# Patient Record
Sex: FEMALE | Race: BLACK OR AFRICAN AMERICAN | NOT HISPANIC OR LATINO | Employment: FULL TIME | URBAN - METROPOLITAN AREA
[De-identification: names, ages, dates, MRNs, and addresses within clinical notes are randomized per-mention and may not be internally consistent; named-entity substitution may affect disease eponyms.]

---

## 2017-10-24 ENCOUNTER — HISTORICAL (OUTPATIENT)
Dept: LAB | Facility: HOSPITAL | Age: 52
End: 2017-10-24

## 2017-10-24 LAB
ABS NEUT (OLG): 5 X10(3)/MCL (ref 1.5–6.9)
ANISOCYTOSIS BLD QL SMEAR: ABNORMAL
BASOPHILS # BLD AUTO: 0 X10(3)/MCL (ref 0–0.1)
BASOPHILS NFR BLD AUTO: 0 % (ref 0–1)
CANCER AG125 SERPL-ACNC: 14.3 U/ML
EOSINOPHIL # BLD AUTO: 0.1 X10(3)/MCL (ref 0–0.6)
EOSINOPHIL NFR BLD AUTO: 1 % (ref 0–5)
ERYTHROCYTE [DISTWIDTH] IN BLOOD BY AUTOMATED COUNT: 15.5 % (ref 11.5–17)
EST. AVERAGE GLUCOSE BLD GHB EST-MCNC: 258 MG/DL
HBA1C MFR BLD: 10.6 % (ref 4.8–6)
HCT VFR BLD AUTO: 31.1 % (ref 36–48)
HGB BLD-MCNC: 10.1 GM/DL (ref 12–16)
HYPOCHROMIA BLD QL SMEAR: ABNORMAL
LYMPHOCYTES # BLD AUTO: 1.8 X10(3)/MCL (ref 0.5–4.1)
LYMPHOCYTES NFR BLD AUTO: 24.7 % (ref 15–40)
MCH RBC QN AUTO: 22 PG (ref 27–34)
MCHC RBC AUTO-ENTMCNC: 32 GM/DL (ref 31–36)
MCV RBC AUTO: 69 FL (ref 80–99)
MICROCYTES BLD QL SMEAR: ABNORMAL
MONOCYTES # BLD AUTO: 0.5 X10(3)/MCL (ref 0–1.1)
MONOCYTES NFR BLD AUTO: 7 % (ref 4–12)
NEUTROPHILS # BLD AUTO: 5 X10(3)/MCL (ref 1.5–6.9)
NEUTROPHILS NFR BLD AUTO: 68 % (ref 43–75)
PLATELET # BLD AUTO: 383 X10(3)/MCL (ref 140–400)
PLATELET # BLD EST: ADEQUATE 10*3/UL
PMV BLD AUTO: 11.2 FL (ref 6.8–10)
RBC # BLD AUTO: 4.48 X10(6)/MCL (ref 4.2–5.4)
RBC MORPH BLD: ABNORMAL
T3RU NFR SERPL: 32 % (ref 30–39)
T4 FREE SERPL-MCNC: 1.29 NG/DL (ref 0.76–1.46)
TSH SERPL-ACNC: 1.44 MIU/ML (ref 0.36–3.74)
WBC # SPEC AUTO: 7.4 X10(3)/MCL (ref 4.5–11.5)

## 2017-11-29 ENCOUNTER — HISTORICAL (OUTPATIENT)
Dept: ADMINISTRATIVE | Facility: HOSPITAL | Age: 52
End: 2017-11-29

## 2017-11-29 LAB
ABS NEUT (OLG): 6.56 X10(3)/MCL (ref 2.1–9.2)
ALBUMIN SERPL-MCNC: 3.2 GM/DL (ref 3.4–5)
ALBUMIN/GLOB SERPL: 0.7 RATIO (ref 1.1–2)
ALP SERPL-CCNC: 79 UNIT/L (ref 38–126)
ALT SERPL-CCNC: 155 UNIT/L (ref 12–78)
AST SERPL-CCNC: 61 UNIT/L (ref 15–37)
B-HCG FREE SERPL-ACNC: 1 MIU/ML
BILIRUB SERPL-MCNC: 0.4 MG/DL (ref 0.2–1)
BILIRUBIN DIRECT+TOT PNL SERPL-MCNC: 0.1 MG/DL (ref 0–0.5)
BILIRUBIN DIRECT+TOT PNL SERPL-MCNC: 0.3 MG/DL (ref 0–0.8)
BUN SERPL-MCNC: 5 MG/DL (ref 7–18)
CALCIUM SERPL-MCNC: 9.3 MG/DL (ref 8.5–10.1)
CANCER AG125 SERPL-ACNC: 22.3 U/ML
CANCER AG19-9 SERPL-ACNC: <1.2 IU/ML (ref 0–35)
CEA SERPL-MCNC: 1 NG/ML (ref 0–3)
CHLORIDE SERPL-SCNC: 107 MMOL/L (ref 98–107)
CO2 SERPL-SCNC: 26 MMOL/L (ref 21–32)
CREAT SERPL-MCNC: 0.75 MG/DL (ref 0.55–1.02)
ERYTHROCYTE [DISTWIDTH] IN BLOOD BY AUTOMATED COUNT: 16.5 % (ref 11.5–17)
GLOBULIN SER-MCNC: 4.7 GM/DL (ref 2.4–3.5)
GLUCOSE SERPL-MCNC: 126 MG/DL (ref 74–106)
HCT VFR BLD AUTO: 28.7 % (ref 37–47)
HGB BLD-MCNC: 9 GM/DL (ref 12–16)
HYPOCHROMIA BLD QL SMEAR: 1
LYMPHOCYTES NFR BLD MANUAL: 23 % (ref 13–40)
LYMPHOCYTES NFR BLD MANUAL: 8 %
MCH RBC QN AUTO: 22.2 PG (ref 27–31)
MCHC RBC AUTO-ENTMCNC: 31.4 GM/DL (ref 33–36)
MCV RBC AUTO: 70.9 FL (ref 80–94)
MONOCYTES NFR BLD MANUAL: 5 % (ref 2–11)
NEUTROPHILS NFR BLD MANUAL: 64 % (ref 47–80)
PLATELET # BLD AUTO: 402 X10(3)/MCL (ref 130–400)
PLATELET # BLD EST: ABNORMAL 10*3/UL
PMV BLD AUTO: 10.7 FL (ref 7.4–10.4)
POTASSIUM SERPL-SCNC: 4.1 MMOL/L (ref 3.5–5.1)
PROT SERPL-MCNC: 7.9 GM/DL (ref 6.4–8.2)
RBC # BLD AUTO: 4.05 X10(6)/MCL (ref 4.2–5.4)
SODIUM SERPL-SCNC: 140 MMOL/L (ref 136–145)
WBC # SPEC AUTO: 9.4 X10(3)/MCL (ref 4.5–11.5)

## 2017-11-30 ENCOUNTER — HISTORICAL (OUTPATIENT)
Dept: RADIOLOGY | Facility: HOSPITAL | Age: 52
End: 2017-11-30

## 2017-12-27 ENCOUNTER — HISTORICAL (OUTPATIENT)
Dept: LAB | Facility: HOSPITAL | Age: 52
End: 2017-12-27

## 2017-12-27 LAB
ABS NEUT (OLG): 5.06 X10(3)/MCL (ref 2.1–9.2)
ALBUMIN SERPL-MCNC: 3.3 GM/DL (ref 3.4–5)
ALBUMIN/GLOB SERPL: 0.8 {RATIO}
ALP SERPL-CCNC: 84 UNIT/L (ref 38–126)
ALT SERPL-CCNC: 27 UNIT/L (ref 12–78)
APTT PPP: 30.6 SECOND(S) (ref 24.8–36.9)
AST SERPL-CCNC: 18 UNIT/L (ref 15–37)
BASOPHILS # BLD AUTO: 0 X10(3)/MCL (ref 0–0.2)
BASOPHILS NFR BLD AUTO: 0 %
BILIRUB SERPL-MCNC: 0.3 MG/DL (ref 0.2–1)
BILIRUBIN DIRECT+TOT PNL SERPL-MCNC: 0.1 MG/DL (ref 0–0.2)
BILIRUBIN DIRECT+TOT PNL SERPL-MCNC: 0.2 MG/DL (ref 0–0.8)
BUN SERPL-MCNC: 7 MG/DL (ref 7–18)
CALCIUM SERPL-MCNC: 9.2 MG/DL (ref 8.5–10.1)
CHLORIDE SERPL-SCNC: 105 MMOL/L (ref 98–107)
CO2 SERPL-SCNC: 25 MMOL/L (ref 21–32)
CREAT SERPL-MCNC: 0.76 MG/DL (ref 0.55–1.02)
EOSINOPHIL # BLD AUTO: 0.1 X10(3)/MCL (ref 0–0.9)
EOSINOPHIL NFR BLD AUTO: 1 %
ERYTHROCYTE [DISTWIDTH] IN BLOOD BY AUTOMATED COUNT: 15.1 % (ref 11.5–17)
GLOBULIN SER-MCNC: 4.3 GM/DL (ref 2.4–3.5)
GLUCOSE SERPL-MCNC: 142 MG/DL (ref 74–106)
GROUP & RH: NORMAL
HCT VFR BLD AUTO: 24.5 % (ref 37–47)
HGB BLD-MCNC: 7.6 GM/DL (ref 12–16)
INR PPP: 1.14 (ref 0–1.27)
LYMPHOCYTES # BLD AUTO: 2.3 X10(3)/MCL (ref 0.6–4.6)
LYMPHOCYTES NFR BLD AUTO: 28 %
MAGNESIUM SERPL-MCNC: 1.7 MG/DL (ref 1.8–2.4)
MCH RBC QN AUTO: 21.3 PG (ref 27–31)
MCHC RBC AUTO-ENTMCNC: 31 GM/DL (ref 33–36)
MCV RBC AUTO: 68.8 FL (ref 80–94)
MONOCYTES # BLD AUTO: 0.5 X10(3)/MCL (ref 0.1–1.3)
MONOCYTES NFR BLD AUTO: 7 %
NEUTROPHILS # BLD AUTO: 5.06 X10(3)/MCL (ref 2.1–9.2)
NEUTROPHILS NFR BLD AUTO: 64 %
PHOSPHATE SERPL-MCNC: 3 MG/DL (ref 2.5–4.9)
PLATELET # BLD AUTO: 367 X10(3)/MCL (ref 130–400)
PMV BLD AUTO: 10.5 FL (ref 9.4–12.4)
POTASSIUM SERPL-SCNC: 4.3 MMOL/L (ref 3.5–5.1)
PROT SERPL-MCNC: 7.6 GM/DL (ref 6.4–8.2)
PROTHROMBIN TIME: 15 SECOND(S) (ref 12.2–14.7)
RBC # BLD AUTO: 3.56 X10(6)/MCL (ref 4.2–5.4)
SODIUM SERPL-SCNC: 140 MMOL/L (ref 136–145)
WBC # SPEC AUTO: 8 X10(3)/MCL (ref 4.5–11.5)

## 2018-01-03 ENCOUNTER — HOSPITAL ENCOUNTER (OUTPATIENT)
Dept: MEDSURG UNIT | Facility: HOSPITAL | Age: 53
End: 2018-01-03

## 2018-01-03 ENCOUNTER — HISTORICAL (OUTPATIENT)
Dept: HEMATOLOGY/ONCOLOGY | Facility: CLINIC | Age: 53
End: 2018-01-03

## 2018-01-03 LAB
ABS NEUT (OLG): 6.76 X10(3)/MCL (ref 2.1–9.2)
BASOPHILS # BLD AUTO: 0 X10(3)/MCL (ref 0–0.2)
BASOPHILS NFR BLD AUTO: 0.2 %
CROSSMATCH INTERPRETATION: NORMAL
EOSINOPHIL # BLD AUTO: 0.1 X10(3)/MCL (ref 0–0.9)
EOSINOPHIL NFR BLD AUTO: 1.2 %
ERYTHROCYTE [DISTWIDTH] IN BLOOD BY AUTOMATED COUNT: 15.4 % (ref 11.5–17)
GROUP & RH: NORMAL
HCT VFR BLD AUTO: 24.9 % (ref 37–47)
HGB BLD-MCNC: 7.9 GM/DL (ref 12–16)
LYMPHOCYTES # BLD AUTO: 2.2 X10(3)/MCL (ref 0.6–4.6)
LYMPHOCYTES NFR BLD AUTO: 22.7 %
MCH RBC QN AUTO: 21.4 PG (ref 27–31)
MCHC RBC AUTO-ENTMCNC: 31.7 GM/DL (ref 33–36)
MCV RBC AUTO: 67.5 FL (ref 80–94)
MONOCYTES # BLD AUTO: 0.7 X10(3)/MCL (ref 0.1–1.3)
MONOCYTES NFR BLD AUTO: 7.3 %
NEUTROPHILS # BLD AUTO: 6.8 X10(3)/MCL (ref 2.1–9.2)
NEUTROPHILS NFR BLD AUTO: 68.6 %
PLATELET # BLD AUTO: 335 X10(3)/MCL (ref 130–400)
PMV BLD AUTO: 10.1 FL (ref 9.4–12.4)
PRODUCT READY: NORMAL
RBC # BLD AUTO: 3.69 X10(6)/MCL (ref 4.2–5.4)
TRANSFUSION ORDER: NORMAL
WBC # SPEC AUTO: 9.9 X10(3)/MCL (ref 4.5–11.5)

## 2018-01-24 RX ORDER — GLIPIZIDE 5 MG/1
5 TABLET ORAL 2 TIMES DAILY WITH MEALS
COMMUNITY
End: 2018-01-25

## 2018-01-24 RX ORDER — LISINOPRIL 20 MG/1
20 TABLET ORAL DAILY
COMMUNITY
End: 2018-01-25

## 2018-01-24 RX ORDER — METFORMIN HYDROCHLORIDE 500 MG/1
500 TABLET ORAL 2 TIMES DAILY WITH MEALS
COMMUNITY
End: 2018-01-25

## 2018-01-24 RX ORDER — ASPIRIN 81 MG/1
81 TABLET ORAL DAILY
COMMUNITY
End: 2018-01-25

## 2018-01-24 RX ORDER — NORETHINDRONE 5 MG/1
5 TABLET ORAL DAILY
COMMUNITY
End: 2018-01-25

## 2018-01-24 RX ORDER — AMLODIPINE BESYLATE 5 MG/1
5 TABLET ORAL DAILY
COMMUNITY
End: 2018-01-25

## 2018-01-25 ENCOUNTER — OFFICE VISIT (OUTPATIENT)
Dept: SURGERY | Facility: CLINIC | Age: 53
End: 2018-01-25
Payer: COMMERCIAL

## 2018-01-25 ENCOUNTER — HOSPITAL ENCOUNTER (OUTPATIENT)
Dept: CARDIOLOGY | Facility: CLINIC | Age: 53
Discharge: HOME | End: 2018-01-25
Payer: COMMERCIAL

## 2018-01-25 ENCOUNTER — INITIAL CONSULT (OUTPATIENT)
Dept: GYNECOLOGIC ONCOLOGY | Facility: CLINIC | Age: 53
End: 2018-01-25
Payer: COMMERCIAL

## 2018-01-25 ENCOUNTER — HOSPITAL ENCOUNTER (OUTPATIENT)
Dept: RADIOLOGY | Facility: HOSPITAL | Age: 53
Discharge: HOME | End: 2018-01-25
Attending: OBSTETRICS & GYNECOLOGY
Payer: COMMERCIAL

## 2018-01-25 ENCOUNTER — TELEPHONE (OUTPATIENT)
Dept: GYNECOLOGIC ONCOLOGY | Facility: CLINIC | Age: 53
End: 2018-01-25

## 2018-01-25 VITALS
HEART RATE: 79 BPM | SYSTOLIC BLOOD PRESSURE: 175 MMHG | WEIGHT: 235.25 LBS | HEIGHT: 64 IN | BODY MASS INDEX: 40.16 KG/M2 | DIASTOLIC BLOOD PRESSURE: 93 MMHG

## 2018-01-25 VITALS
BODY MASS INDEX: 39.45 KG/M2 | DIASTOLIC BLOOD PRESSURE: 74 MMHG | HEIGHT: 65 IN | WEIGHT: 236.75 LBS | HEART RATE: 77 BPM | SYSTOLIC BLOOD PRESSURE: 155 MMHG

## 2018-01-25 DIAGNOSIS — N83.8 OVARIAN MASS, RIGHT: ICD-10-CM

## 2018-01-25 DIAGNOSIS — C18.2 CANCER OF RIGHT COLON: Primary | ICD-10-CM

## 2018-01-25 DIAGNOSIS — E11.9 TYPE 2 DIABETES MELLITUS WITHOUT COMPLICATION, WITHOUT LONG-TERM CURRENT USE OF INSULIN: ICD-10-CM

## 2018-01-25 DIAGNOSIS — C18.2 CANCER OF RIGHT COLON: ICD-10-CM

## 2018-01-25 DIAGNOSIS — I10 ESSENTIAL HYPERTENSION: ICD-10-CM

## 2018-01-25 DIAGNOSIS — N83.8 OVARIAN MASS, RIGHT: Primary | ICD-10-CM

## 2018-01-25 PROCEDURE — 71046 X-RAY EXAM CHEST 2 VIEWS: CPT | Mod: TC,FY

## 2018-01-25 PROCEDURE — 71046 X-RAY EXAM CHEST 2 VIEWS: CPT | Mod: 26,,, | Performed by: RADIOLOGY

## 2018-01-25 PROCEDURE — 99999 PR PBB SHADOW E&M-EST. PATIENT-LVL III: CPT | Mod: PBBFAC,,, | Performed by: OBSTETRICS & GYNECOLOGY

## 2018-01-25 PROCEDURE — 93000 ELECTROCARDIOGRAM COMPLETE: CPT | Mod: S$GLB,,, | Performed by: INTERNAL MEDICINE

## 2018-01-25 PROCEDURE — 99999 PR PBB SHADOW E&M-EST. PATIENT-LVL III: CPT | Mod: PBBFAC,,, | Performed by: COLON & RECTAL SURGERY

## 2018-01-25 PROCEDURE — 99205 OFFICE O/P NEW HI 60 MIN: CPT | Mod: S$GLB,,, | Performed by: COLON & RECTAL SURGERY

## 2018-01-25 PROCEDURE — 99205 OFFICE O/P NEW HI 60 MIN: CPT | Mod: S$GLB,,, | Performed by: OBSTETRICS & GYNECOLOGY

## 2018-01-25 RX ORDER — CALCIUM CITRATE/VITAMIN D3 200MG-6.25
TABLET ORAL
COMMUNITY
Start: 2017-11-22

## 2018-01-25 RX ORDER — SODIUM CHLORIDE 9 MG/ML
INJECTION, SOLUTION INTRAVENOUS CONTINUOUS
Status: CANCELLED | OUTPATIENT
Start: 2018-01-25

## 2018-01-25 RX ORDER — LANCETS 30 GAUGE
EACH MISCELLANEOUS
COMMUNITY
Start: 2017-11-22

## 2018-01-25 RX ORDER — METFORMIN HYDROCHLORIDE 1000 MG/1
1000 TABLET ORAL 2 TIMES DAILY WITH MEALS
COMMUNITY
Start: 2018-01-04 | End: 2018-02-22

## 2018-01-25 RX ORDER — SODIUM, POTASSIUM,MAG SULFATES 17.5-3.13G
SOLUTION, RECONSTITUTED, ORAL ORAL
COMMUNITY
Start: 2017-11-16 | End: 2018-01-25

## 2018-01-25 RX ORDER — CARVEDILOL 3.12 MG/1
TABLET ORAL
COMMUNITY
Start: 2018-01-04 | End: 2018-01-25

## 2018-01-25 RX ORDER — LISINOPRIL 30 MG/1
30 TABLET ORAL DAILY
COMMUNITY
Start: 2018-01-04

## 2018-01-25 RX ORDER — METRONIDAZOLE 500 MG/100ML
500 INJECTION, SOLUTION INTRAVENOUS
Status: CANCELLED | OUTPATIENT
Start: 2018-01-25

## 2018-01-25 RX ORDER — LIDOCAINE HYDROCHLORIDE 10 MG/ML
1 INJECTION, SOLUTION EPIDURAL; INFILTRATION; INTRACAUDAL; PERINEURAL ONCE
Status: CANCELLED | OUTPATIENT
Start: 2018-01-25 | End: 2018-01-25

## 2018-01-25 RX ORDER — FERROUS GLUCONATE 270(27)MG
27 TABLET ORAL 3 TIMES DAILY
COMMUNITY
End: 2018-08-23

## 2018-01-25 RX ORDER — MUPIROCIN 20 MG/G
OINTMENT TOPICAL
Status: CANCELLED | OUTPATIENT
Start: 2018-01-25

## 2018-01-25 RX ORDER — ACETAMINOPHEN 10 MG/ML
1000 INJECTION, SOLUTION INTRAVENOUS
Status: CANCELLED | OUTPATIENT
Start: 2018-01-25 | End: 2018-01-25

## 2018-01-25 RX ORDER — METRONIDAZOLE 500 MG/1
TABLET ORAL
Qty: 3 TABLET | Refills: 0 | Status: SHIPPED | OUTPATIENT
Start: 2018-01-25 | End: 2018-02-22

## 2018-01-25 RX ORDER — SITAGLIPTIN 100 MG/1
100 TABLET, FILM COATED ORAL DAILY
COMMUNITY
Start: 2018-01-04

## 2018-01-25 RX ORDER — NEOMYCIN SULFATE 500 MG/1
1000 TABLET ORAL ONCE
Qty: 6 TABLET | Refills: 0 | Status: SHIPPED | OUTPATIENT
Start: 2018-01-25 | End: 2018-01-25

## 2018-01-25 RX ORDER — CARVEDILOL 6.25 MG/1
TABLET ORAL
COMMUNITY
Start: 2018-01-23 | End: 2019-02-27

## 2018-01-25 NOTE — PROGRESS NOTES
Patient ID:  Raegan Trinidad is a 53 y.o. female     Chief Complaint:   Chief Complaint   Patient presents with    Colon Cancer        HPI: Referred by Dr Estevez for right colon cancer.  Patient initially saw Dr. Meliza Stevens with abnormal uterine bleeding.  EMBX was negative. She was given Aygestin for this.  Initially she had a ultrasound done that showed a 7 cm right ovarian cyst.  Tumor markers were done that included a Marilou and inhibin.  The inhibin B was elevated at 155.7 pg/mL. She underwent a screening colonoscopy in November 2017  and was found to ave joel intra-luminal mass in the right hepatic flexure.  A biopsy was consistent with primary adenocarcinoma.  The patient had a PET scan done in Nov 2017  that showed increased FDG uptake in the hepatic flexure with that SUV of 7.8.The pelvic mass was PET negative.      A CT scan of the abdomen and pelvis in Nov 2017 showed a 7 cm right adnexal mass.  Endometrial stripe of 16 mm.    The patient was initially scheduled for a  right hemicolectomy with  Dr. Vivek Vazquez.  She was also seen by Dr. Pancho Canada, a gynecologic oncologist, in [place], [place].  The plan was for a combined procedure with a total abdominal hysterectomy and bilateral salpingo-oophorectomy and right hemicolectomy.     ROS:        Constitutional: No fever, chills, activity or appetite change.      HENT: No hearing loss, facial swelling, neck pain or stiffness.       Eyes: No discharge, itching and visual disturbance.      Respiratory: No apnea, cough, choking or shortness of breath.       Cardiovascular: No leg swelling or chest pain      Gastrointestinal: No abdominal distention or change in bowel habbits     Genitourinary: No dysuria, frequency or flank pain.      Musculoskeletal: No arthralgias or gait problem.      Neurological: No dizziness, seizures or weakness.      Hematological: No adenopathy.      Psychiatric/Behavioral: No hallucinations or behavioral problems.     PMH: Diabetes,  HTN    PE:    APPEARANCE: Well nourished, well developed, in no acute distress.   CHEST: Lungs clear. Normal respiratory effort.  CARDIOVASCULAR: Normal rhythm. No edema.  ABDOMEN: Soft. No tenderness or masses.  Rectum:  Normal skin, NST, no masses or tenderness per Dr Estevez    Musculoskeletal: Symmetric, normal range of motion and strength.   Neurological: Alert and oriented to person, place, and time. Normal reflexes.   Skin: Skin is warm and dry.   Psychiatric: Normal mood and affect. Behavior is normal and appropriate.     Impression: Right colon cancer and adenoxal mass  PLAN: Discussed options. Recommend ext right colectomy and TAHBSO ( by Dr Estevez). Feb 5, 2018  I have explained the procedure including indications, alternatives, expected outcomes and potential complications. The patient appears to understand and gives informed consent. The patient is medically ready for surgery.

## 2018-01-25 NOTE — PROGRESS NOTES
Subjective:       Patient ID: Raegan Trinidad is a 53 y.o. female.    Chief Complaint: No chief complaint on file.    HPI     Patient initially saw Dr. Meliza Stevens with abnormal uterine bleeding.  EMBX was negative. She was given Aygestin for this.  Initially she had a ultrasound done that showed a 7 cm right ovarian cyst.  Tumor markers were done that included a [place] and inhibin.  The inhibin B was elevated at 155.7 pg/mL.    She underwent a screening colonoscopy in 2017  and was found to ave joel intra-luminal mass in the right hepatic flexure.  A biopsy was consistent with primary adenocarcinoma.  The patient had a PET scan done in 2017  that showed increased FDG uptake in the hepatic flexure with that SUV of 7.8.The pelvic mass was PET negative.     A CT scan of the abdomen and pelvis in 2017 showed a 7 cm right adnexal mass.  Endometrial stripe of 16 mm.        The patient was initially scheduled for a  right hemicolectomy with  Dr. Vivek Vazquez.  She was also seen by Dr. Pancho Canada, a gynecologic oncologist, in [place], [place].  The plan was for a combined procedure with a total abdominal hysterectomy and bilateral salpingo-oophorectomy and right hemicolectomy.     Unfortunately Dr. Canada left [place] and so the patient was referred by Dr. Stevens for her gynecologic oncology care.    I discussed her care with Dr. Miguel Leggett who is to see the patient today as well.    Past Medical History:   Diagnosis Date    Anemia     Cancer of right colon 2017    Diabetes mellitus     Encounter for blood transfusion     Essential hypertension 2018    Heart murmur     Hypertension     Ovarian mass, right 2018    Type 2 diabetes mellitus without complication, without long-term current use of insulin 2018     Past Surgical History:   Procedure Laterality Date    abdominal ex-lap   1994    to remove POC after delivery      SECTION      x 1    TONSILLECTOMY       Family  "History   Problem Relation Age of Onset    Lung disease Father      malignant neoplasm of lung     Prostate cancer Father      Malignant neoplasm of prostate     Ovarian cancer Neg Hx     Uterine cancer Neg Hx     Breast cancer Neg Hx     Colon cancer Neg Hx      Social History   Substance Use Topics    Smoking status: Never Smoker    Smokeless tobacco: Never Used    Alcohol use No     Review of patient's allergies indicates:  No Known Allergies    Current Outpatient Prescriptions:     carvedilol (COREG) 6.25 MG tablet, , Disp: , Rfl:     FERROUS GLUCONATE (FERGON) 270 mg (27 mg iron) Tab, Take 27 mg by mouth 3 (three) times daily., Disp: , Rfl:     JANUVIA 100 mg Tab, , Disp: , Rfl:     lisinopril (PRINIVIL,ZESTRIL) 30 MG tablet, Take 30 mg by mouth once daily. , Disp: , Rfl:     metFORMIN (GLUCOPHAGE) 1000 MG tablet, Take 1,000 mg by mouth 2 (two) times daily with meals. , Disp: , Rfl:     TRUE METRIX GLUCOSE TEST STRIP Strp, , Disp: , Rfl:     ULTRA THIN LANCETS 30 gauge Misc, , Disp: , Rfl:     Review of Systems   Constitutional: Negative for chills, fatigue and fever.   Respiratory: Negative for cough, shortness of breath and wheezing.    Cardiovascular: Negative for chest pain, palpitations and leg swelling.   Gastrointestinal: Negative for abdominal pain, constipation, diarrhea, nausea and vomiting.   Genitourinary: Negative for difficulty urinating, dysuria, frequency, genital sores, hematuria, urgency, vaginal bleeding, vaginal discharge and vaginal pain.   Neurological: Negative for weakness.   Hematological: Negative for adenopathy. Does not bruise/bleed easily.   Psychiatric/Behavioral: The patient is not nervous/anxious.        Objective:   BP (!) 155/74   Pulse 77   Ht 5' 4.5" (1.638 m)   Wt 107.4 kg (236 lb 12.4 oz)   BMI 40.01 kg/m²      Physical Exam   Constitutional: She is oriented to person, place, and time. She appears well-developed and well-nourished.   HENT:   Head: " Normocephalic and atraumatic.   Cardiovascular: Normal rate and regular rhythm.    Pulmonary/Chest: Effort normal and breath sounds normal.   Abdominal: Soft. She exhibits no distension and no mass. There is no tenderness. There is no rebound and no guarding. No hernia.   Low midline incision.    Genitourinary:   Genitourinary Comments: Bimanual exam:  Vulva: no lesions. Normal appearance  Urethra: Normal size and location. No lesions  Bladder: No masses or tenderness.  Vagina: normal mucosa. No lesion. Narrow vagina.   Cervix: normal   Uterus: unable to palpate due to obesity  Adnexa: no masses.  Rectovaginal: No posterior cul de sac thickening or nodularity.  Rectal: no masses. Nontender. Normal tone.      Neurological: She is alert and oriented to person, place, and time.   Skin: Skin is warm and dry.   Psychiatric: She has a normal mood and affect. Her behavior is normal. Judgment and thought content normal.       Assessment:       1. Cancer of right colon    2. Ovarian mass, right    3. Essential hypertension    4. Type 2 diabetes mellitus without complication, without long-term current use of insulin        Plan:   Cancer of right colon  Patient to see Dr. Leggett today to discuss right hemicolectomy.   Will need mechanical bowel prep per CRS    -     Basic metabolic panel; Future; Expected date: 01/25/2018  -     CBC auto differential; Future; Expected date: 01/25/2018  -     X-Ray Chest PA And Lateral; Future; Expected date: 01/25/2018  -     EKG 12-lead; Future    Ovarian mass, right  I have recommended MARLENE/BSO.  Will coordinate with Dr. Leggett.   Consent forms were reviewed with patient. Questions were answered. Patient voiced understanding. Consents were signed.     Essential hypertension    Type 2 diabetes mellitus without complication, without long-term current use of insulin      Distress Screening Results: Psychosocial Distress screening score of Distress Score: 0 noted and reviewed. No intervention  indicated.

## 2018-01-25 NOTE — LETTER
January 25, 2018      DUANE Watson MD  [address] [place] [address]           [facility] - GYN Oncology  [address]  [place] [address]  Phone: [phone]          Patient: Raegan Trinidad   MR Number: [id number]   YOB: 1965   Date of Visit: 1/25/2018       Dear Dr. DUANE Watson:    Thank you for referring Raegan Trinidad to me for evaluation. Attached you will find relevant portions of my assessment and plan of care.    If you have questions, please do not hesitate to call me. I look forward to following Raegan Trinidad along with you.    Sincerely,    Arsalan Estevez MD    Enclosure  CC:  No Recipients    If you would like to receive this communication electronically, please contact [email or web address] or [phone] to request more information on [facility] Link access.    For providers and/or their staff who would like to refer a patient to [facility], please contact us through our one-stop-shop provider referral line, [facility], at [phone].    If you feel you have received this communication in error or would no longer like to receive these types of communications, please e-mail [email or web address]

## 2018-01-25 NOTE — LETTER
January 25, 2018      Arsalan Estevez MD  [address]  [facility] [address]           [facility]  [address]  [facility] [address]  Phone: [phone]          Patient: Raegan Trinidad   MR Number: [id number]   YOB: 1965   Date of Visit: 1/25/2018       Dear Dr. Arsalan Estevez:    Thank you for referring Raegan Trinidad to me for evaluation. Attached you will find relevant portions of my assessment and plan of care.    If you have questions, please do not hesitate to call me. I look forward to following Raegan Trinidad along with you.    Sincerely,    Miguel Leggett MD    Enclosure  CC:  No Recipients    If you would like to receive this communication electronically, please contact [email or web address] or [phone] to request more information on [facility] Link access.    For providers and/or their staff who would like to refer a patient to [facility], please contact us through our one-stop-shop provider referral line, [facility], at [phone].    If you feel you have received this communication in error or would no longer like to receive these types of communications, please e-mail [email or web address]

## 2018-01-25 NOTE — LETTER
January 25, 2018        Jud Stevens MD              [facility] - GYN Oncology  [address]  [facility] [address]  Phone: [phone]   Patient: Raegan Trinidad   MR Number: [id number]   YOB: 1965   Date of Visit: 1/25/2018       Dear Dr. Jud Stevens:    Thank you for referring Raegan Trinidad to me for evaluation. Attached you will find relevant portions of my assessment and plan of care.    If you have questions, please do not hesitate to call me. I look forward to following Raegan Trinidad along with you.    Sincerely,      Arsalan Estevez MD            CC  No Recipients    Enclosure

## 2018-01-25 NOTE — H&P
Raegan Trinidad is a 53 y.o. female     Chief Complaint:   Chief Complaint   Patient presents with    Colon Cancer        HPI: Referred by Dr Estevez for right colon cancer.  Patient initially saw Dr. Meliza Stevens with abnormal uterine bleeding.  EMBX was negative. She was given Aygestin for this.  Initially she had a ultrasound done that showed a 7 cm right ovarian cyst.  Tumor markers were done that included a [place] and inhibin.  The inhibin B was elevated at 155.7 pg/mL. She underwent a screening colonoscopy in November 2017  and was found to ave joel intra-luminal mass in the right hepatic flexure.  A biopsy was consistent with primary adenocarcinoma.  The patient had a PET scan done in Nov 2017  that showed increased FDG uptake in the hepatic flexure with that SUV of 7.8.The pelvic mass was PET negative.      A CT scan of the abdomen and pelvis in Nov 2017 showed a 7 cm right adnexal mass.  Endometrial stripe of 16 mm.    The patient was initially scheduled for a  right hemicolectomy with  Dr. Vivek Vazquez.  She was also seen by Dr. Pancho Canada, a gynecologic oncologist, in [place], [place].  The plan was for a combined procedure with a total abdominal hysterectomy and bilateral salpingo-oophorectomy and right hemicolectomy.     ROS:        Constitutional: No fever, chills, activity or appetite change.      HENT: No hearing loss, facial swelling, neck pain or stiffness.       Eyes: No discharge, itching and visual disturbance.      Respiratory: No apnea, cough, choking or shortness of breath.       Cardiovascular: No leg swelling or chest pain      Gastrointestinal: No abdominal distention or change in bowel habbits     Genitourinary: No dysuria, frequency or flank pain.      Musculoskeletal: No arthralgias or gait problem.      Neurological: No dizziness, seizures or weakness.      Hematological: No adenopathy.      Psychiatric/Behavioral: No hallucinations or behavioral problems.     PMH: Diabetes,  HTN    PE:    APPEARANCE: Well nourished, well developed, in no acute distress.   CHEST: Lungs clear. Normal respiratory effort.  CARDIOVASCULAR: Normal rhythm. No edema.  ABDOMEN: Soft. No tenderness or masses.  Rectum:  Normal skin, NST, no masses or tenderness per Dr Estevez    Musculoskeletal: Symmetric, normal range of motion and strength.   Neurological: Alert and oriented to person, place, and time. Normal reflexes.   Skin: Skin is warm and dry.   Psychiatric: Normal mood and affect. Behavior is normal and appropriate.     Impression: Right colon cancer and adenoxal mass  PLAN: Discussed options. Recommend ext right colectomy and TAHBSO ( by Dr Estevez). Feb 5, 2018  I have explained the procedure including indications, alternatives, expected outcomes and potential complications. The patient appears to understand and gives informed consent. The patient is medically ready for surgery.

## 2018-01-25 NOTE — LETTER
January 25, 2018    Raegan Trinidad  [address]  [place] [address]             [facility]  [address]  [facility] [address]  Phone: [phone] {WILDCARD:[id number]}

## 2018-01-29 ENCOUNTER — TELEPHONE (OUTPATIENT)
Dept: SURGERY | Facility: CLINIC | Age: 53
End: 2018-01-29

## 2018-01-29 NOTE — TELEPHONE ENCOUNTER
Spoke with pt's aunt and informed her I will submit [facility] req for 6 nights per pt request for hospital stay

## 2018-01-29 NOTE — TELEPHONE ENCOUNTER
----- Message from Atiya yLnn sent at 1/29/2018 12:50 PM CST -----  Contact: Pt Aunt Mirta Christian:[phone] or cell:[phone]  Pt Aunt  called and states she would like to speak with 's nurse in regards to getting the stay extended at the [facility] for the pt surgery. Pt Aunt would like to speak with the nurse.

## 2018-02-01 ENCOUNTER — ANESTHESIA EVENT (OUTPATIENT)
Dept: SURGERY | Facility: HOSPITAL | Age: 53
DRG: 330 | End: 2018-02-01
Payer: COMMERCIAL

## 2018-02-01 NOTE — PRE-PROCEDURE INSTRUCTIONS
"PreOp Instructions given:     - Verbal medication information (what to hold and what to take)   - NPO guidelines   - Arrival place directions given; time to be given the day before procedure by the   Surgeon's Office   - Bathing with antibacterial soap   - Don't wear any jewelry or bring any valuables AM of surgery   - No makeup or moisturizer to face   - No perfume/cologne, powder, lotions or aftershave     Pt. verbalized understanding.    PT DENIES ANY PERSONAL HISTORY OF COMPLICATIONS WITH ANETHESIA OR SEDATION.    PT REPORTS THAT HER SISTER, ALSO A DIABETIC, HAD A STROKE AFTER A BKA ~ 3 YRS AGO. THE SAME SISTER "WAS GIVEN TOO MUCH ANESTHESIA" AND HAD TO BE CODED AFTER A STUMP REVISION ~2 YRS AGO.        "

## 2018-02-01 NOTE — ANESTHESIA PREPROCEDURE EVALUATION
2018  Raegan Trinidad is a 53 y.o., female.      Pre-operative evaluation for Procedure(s) (LRB):  COLECTOMY-RIGHT, extended (N/A)  HYSTERECTOMY-ABDOMINAL-TOTAL (MARLENE) (Bilateral)  [id number] (BSO) (Bilateral)    Raegan Trinidad is a 53 y.o. female     LDA:     Prev airway:     Drips:     Patient Active Problem List   Diagnosis    Ovarian mass, right    Cancer of right colon    Essential hypertension    Type 2 diabetes mellitus without complication, without long-term current use of insulin       Review of patient's allergies indicates:  No Known Allergies     No current facility-administered medications on file prior to encounter.      Current Outpatient Prescriptions on File Prior to Encounter   Medication Sig Dispense Refill    carvedilol (COREG) 6.25 MG tablet       FERROUS GLUCONATE (FERGON) 270 mg (27 mg iron) Tab Take 27 mg by mouth 3 (three) times daily.      JANUVIA 100 mg Tab Take 100 mg by mouth once daily.       lisinopril (PRINIVIL,ZESTRIL) 30 MG tablet Take 30 mg by mouth once daily.       metFORMIN (GLUCOPHAGE) 1000 MG tablet Take 1,000 mg by mouth 2 (two) times daily with meals.       metroNIDAZOLE (FLAGYL) 500 MG tablet On day before surgery, take 500mg by mouth at 1pm, 2pm, and 11pm. 3 tablet 0    TRUE METRIX GLUCOSE TEST STRIP Strp       ULTRA THIN LANCETS 30 gauge Misc          Past Surgical History:   Procedure Laterality Date    abdominal ex-lap   1994    to remove POC after delivery      SECTION      x 1    TONSILLECTOMY         Social History     Social History    Marital status: Single     Spouse name: N/A    Number of children: N/A    Years of education: N/A     Occupational History    Not on file.     Social History Main Topics    Smoking status: Never Smoker    Smokeless tobacco: Never Used    Alcohol use No    Drug  "use: No    Sexual activity: Not on file     Other Topics Concern    Not on file     Social History Narrative    No narrative on file         Vital Signs Range (Last 24H):  Temp:  [36.8 °C (98.2 °F)]   Pulse:  [73]   Resp:  [24]   BP: (139)/(57)   SpO2:  [100 %]       CBC: No results for input(s): WBC, RBC, HGB, HCT, PLT, MCV, MCH, MCHC in the last 72 hours.    CMP: No results for input(s): NA, K, CL, CO2, BUN, CREATININE, GLU, MG, PHOS, CALCIUM, ALBUMIN, PROT, ALKPHOS, ALT, AST, BILITOT in the last 72 hours.    INR  No results for input(s): PT, INR, PROTIME, APTT in the last 72 hours.        Diagnostic Studies:      EKD Echo:          Anesthesia Evaluation         Review of Systems  Anesthesia Hx:  Family Hx of Anesthesia complications: Family Anesthesia Complications are PT DENIES ANY PERSONAL HISTORY OF COMPLICATIONS WITH ANETHESIA OR SEDATION.    PT REPORTS THAT HER SISTER, ALSO A DIABETIC, HAD A STROKE AFTER A BKA ~ 3 YRS AGO. THE SAME SISTER "WAS GIVEN TOO MUCH ANESTHESIA" AND HAD TO BE CODED AFTER A STUMP REVISION ~2 YRS AGO.             Physical Exam  General:  Well nourished    Airway/Jaw/Neck:  Airway Findings: Mouth Opening: Normal Tongue: Normal  General Airway Assessment: Adult  Mallampati: II  Improves to I with phonation.  TM Distance: Normal, at least 6 cm      Dental:  Dental Findings:         Mental Status:  Mental Status Findings:  Cooperative, Alert and Oriented         Anesthesia Plan  Type of Anesthesia, risks & benefits discussed:  Anesthesia Type:  general  Patient's Preference:   Intra-op Monitoring Plan: standard ASA monitors  Intra-op Monitoring Plan Comments:   Post Op Pain Control Plan: multimodal analgesia  Post Op Pain Control Plan Comments:   Induction:   IV  Beta Blocker:  Patient is not currently on a Beta-Blocker (No further documentation required).       Informed Consent: Patient understands risks and agrees with Anesthesia plan.  Questions answered. Anesthesia consent " signed with patient.  ASA Score: 3     Day of Surgery Review of History & Physical:    H&P update referred to the surgeon.         Ready For Surgery From Anesthesia Perspective.

## 2018-02-03 ENCOUNTER — TELEPHONE (OUTPATIENT)
Dept: SURGERY | Facility: CLINIC | Age: 53
End: 2018-02-03

## 2018-02-05 ENCOUNTER — HOSPITAL ENCOUNTER (INPATIENT)
Facility: HOSPITAL | Age: 53
LOS: 4 days | Discharge: HOME | DRG: 330 | End: 2018-02-09
Attending: COLON & RECTAL SURGERY | Admitting: COLON & RECTAL SURGERY
Payer: COMMERCIAL

## 2018-02-05 ENCOUNTER — ANESTHESIA (OUTPATIENT)
Dept: SURGERY | Facility: HOSPITAL | Age: 53
DRG: 330 | End: 2018-02-05
Payer: COMMERCIAL

## 2018-02-05 ENCOUNTER — SURGERY (OUTPATIENT)
Age: 53
End: 2018-02-05

## 2018-02-05 DIAGNOSIS — C18.2 CANCER OF RIGHT COLON: Primary | ICD-10-CM

## 2018-02-05 DIAGNOSIS — N83.8 OVARIAN MASS, RIGHT: ICD-10-CM

## 2018-02-05 DIAGNOSIS — I10 ESSENTIAL HYPERTENSION: ICD-10-CM

## 2018-02-05 DIAGNOSIS — E11.9 TYPE 2 DIABETES MELLITUS WITHOUT COMPLICATION, WITHOUT LONG-TERM CURRENT USE OF INSULIN: ICD-10-CM

## 2018-02-05 LAB
B-HCG UR QL: NEGATIVE
CTP QC/QA: YES
POCT GLUCOSE: 170 MG/DL (ref 70–110)
POCT GLUCOSE: 205 MG/DL (ref 70–110)
POCT GLUCOSE: 215 MG/DL (ref 70–110)
POCT GLUCOSE: 238 MG/DL (ref 70–110)

## 2018-02-05 PROCEDURE — 82962 GLUCOSE BLOOD TEST: CPT | Performed by: COLON & RECTAL SURGERY

## 2018-02-05 PROCEDURE — 58150 TOTAL HYSTERECTOMY: CPT | Mod: ,,, | Performed by: OBSTETRICS & GYNECOLOGY

## 2018-02-05 PROCEDURE — 71000039 HC RECOVERY, EACH ADD'L HOUR: Performed by: COLON & RECTAL SURGERY

## 2018-02-05 PROCEDURE — 88307 TISSUE EXAM BY PATHOLOGIST: CPT | Mod: 26,,,

## 2018-02-05 PROCEDURE — 0DTF0ZZ RESECTION OF RIGHT LARGE INTESTINE, OPEN APPROACH: ICD-10-PCS | Performed by: COLON & RECTAL SURGERY

## 2018-02-05 PROCEDURE — 63600175 PHARM REV CODE 636 W HCPCS: Performed by: NURSE ANESTHETIST, CERTIFIED REGISTERED

## 2018-02-05 PROCEDURE — 88309 TISSUE EXAM BY PATHOLOGIST: CPT | Mod: 26,,,

## 2018-02-05 PROCEDURE — 37000009 HC ANESTHESIA EA ADD 15 MINS: Performed by: COLON & RECTAL SURGERY

## 2018-02-05 PROCEDURE — 88307 TISSUE EXAM BY PATHOLOGIST: CPT

## 2018-02-05 PROCEDURE — 25000003 PHARM REV CODE 250: Performed by: COLON & RECTAL SURGERY

## 2018-02-05 PROCEDURE — D9220A PRA ANESTHESIA: Mod: ANES,,, | Performed by: ANESTHESIOLOGY

## 2018-02-05 PROCEDURE — 0UT90ZZ RESECTION OF UTERUS, OPEN APPROACH: ICD-10-PCS | Performed by: OBSTETRICS & GYNECOLOGY

## 2018-02-05 PROCEDURE — 36000709 HC OR TIME LEV III EA ADD 15 MIN: Performed by: COLON & RECTAL SURGERY

## 2018-02-05 PROCEDURE — 63600175 PHARM REV CODE 636 W HCPCS: Performed by: COLON & RECTAL SURGERY

## 2018-02-05 PROCEDURE — S0030 INJECTION, METRONIDAZOLE: HCPCS | Performed by: STUDENT IN AN ORGANIZED HEALTH CARE EDUCATION/TRAINING PROGRAM

## 2018-02-05 PROCEDURE — 94799 UNLISTED PULMONARY SVC/PX: CPT

## 2018-02-05 PROCEDURE — 0UT20ZZ RESECTION OF BILATERAL OVARIES, OPEN APPROACH: ICD-10-PCS | Performed by: OBSTETRICS & GYNECOLOGY

## 2018-02-05 PROCEDURE — 25000003 PHARM REV CODE 250: Performed by: STUDENT IN AN ORGANIZED HEALTH CARE EDUCATION/TRAINING PROGRAM

## 2018-02-05 PROCEDURE — 63600175 PHARM REV CODE 636 W HCPCS: Performed by: ANESTHESIOLOGY

## 2018-02-05 PROCEDURE — 63600175 PHARM REV CODE 636 W HCPCS: Performed by: STUDENT IN AN ORGANIZED HEALTH CARE EDUCATION/TRAINING PROGRAM

## 2018-02-05 PROCEDURE — C9290 INJ, BUPIVACAINE LIPOSOME: HCPCS | Performed by: COLON & RECTAL SURGERY

## 2018-02-05 PROCEDURE — 27100025 HC TUBING, SET FLUID WARMER: Performed by: NURSE ANESTHETIST, CERTIFIED REGISTERED

## 2018-02-05 PROCEDURE — 44140 PARTIAL REMOVAL OF COLON: CPT | Mod: 22,,, | Performed by: COLON & RECTAL SURGERY

## 2018-02-05 PROCEDURE — 25000003 PHARM REV CODE 250: Performed by: NURSE ANESTHETIST, CERTIFIED REGISTERED

## 2018-02-05 PROCEDURE — S0030 INJECTION, METRONIDAZOLE: HCPCS | Performed by: COLON & RECTAL SURGERY

## 2018-02-05 PROCEDURE — 71000033 HC RECOVERY, INTIAL HOUR: Performed by: COLON & RECTAL SURGERY

## 2018-02-05 PROCEDURE — 20600001 HC STEP DOWN PRIVATE ROOM

## 2018-02-05 PROCEDURE — 0DNF0ZZ RELEASE RIGHT LARGE INTESTINE, OPEN APPROACH: ICD-10-PCS | Performed by: COLON & RECTAL SURGERY

## 2018-02-05 PROCEDURE — 0UT70ZZ RESECTION OF BILATERAL FALLOPIAN TUBES, OPEN APPROACH: ICD-10-PCS | Performed by: OBSTETRICS & GYNECOLOGY

## 2018-02-05 PROCEDURE — D9220A PRA ANESTHESIA: Mod: CRNA,,, | Performed by: NURSE ANESTHETIST, CERTIFIED REGISTERED

## 2018-02-05 PROCEDURE — 37000008 HC ANESTHESIA 1ST 15 MINUTES: Performed by: COLON & RECTAL SURGERY

## 2018-02-05 PROCEDURE — 94761 N-INVAS EAR/PLS OXIMETRY MLT: CPT

## 2018-02-05 PROCEDURE — 27000221 HC OXYGEN, UP TO 24 HOURS

## 2018-02-05 PROCEDURE — 81025 URINE PREGNANCY TEST: CPT | Performed by: COLON & RECTAL SURGERY

## 2018-02-05 PROCEDURE — 27201423 OPTIME MED/SURG SUP & DEVICES STERILE SUPPLY: Performed by: COLON & RECTAL SURGERY

## 2018-02-05 PROCEDURE — 36000708 HC OR TIME LEV III 1ST 15 MIN: Performed by: COLON & RECTAL SURGERY

## 2018-02-05 RX ORDER — ROCURONIUM BROMIDE 10 MG/ML
INJECTION, SOLUTION INTRAVENOUS
Status: DISCONTINUED | OUTPATIENT
Start: 2018-02-05 | End: 2018-02-05

## 2018-02-05 RX ORDER — CEFAZOLIN SODIUM 1 G/3ML
2 INJECTION, POWDER, FOR SOLUTION INTRAMUSCULAR; INTRAVENOUS
Status: COMPLETED | OUTPATIENT
Start: 2018-02-05 | End: 2018-02-05

## 2018-02-05 RX ORDER — MUPIROCIN 20 MG/G
OINTMENT TOPICAL
Status: DISCONTINUED | OUTPATIENT
Start: 2018-02-05 | End: 2018-02-05

## 2018-02-05 RX ORDER — MORPHINE SULFATE 10 MG/ML
INJECTION, SOLUTION INTRAMUSCULAR; INTRAVENOUS
Status: DISCONTINUED | OUTPATIENT
Start: 2018-02-05 | End: 2018-02-05

## 2018-02-05 RX ORDER — PROPOFOL 10 MG/ML
VIAL (ML) INTRAVENOUS
Status: DISCONTINUED | OUTPATIENT
Start: 2018-02-05 | End: 2018-02-05

## 2018-02-05 RX ORDER — NALOXONE HCL 0.4 MG/ML
0.02 VIAL (ML) INJECTION
Status: DISCONTINUED | OUTPATIENT
Start: 2018-02-05 | End: 2018-02-09 | Stop reason: HOSPADM

## 2018-02-05 RX ORDER — NEOSTIGMINE METHYLSULFATE 1 MG/ML
INJECTION, SOLUTION INTRAVENOUS
Status: DISCONTINUED | OUTPATIENT
Start: 2018-02-05 | End: 2018-02-05

## 2018-02-05 RX ORDER — MORPHINE SULFATE 1 MG/ML
INJECTION INTRAVENOUS CONTINUOUS
Status: DISCONTINUED | OUTPATIENT
Start: 2018-02-05 | End: 2018-02-07

## 2018-02-05 RX ORDER — ACETAMINOPHEN 10 MG/ML
1000 INJECTION, SOLUTION INTRAVENOUS
Status: COMPLETED | OUTPATIENT
Start: 2018-02-05 | End: 2018-02-05

## 2018-02-05 RX ORDER — MIDAZOLAM HYDROCHLORIDE 1 MG/ML
INJECTION, SOLUTION INTRAMUSCULAR; INTRAVENOUS
Status: DISCONTINUED | OUTPATIENT
Start: 2018-02-05 | End: 2018-02-05

## 2018-02-05 RX ORDER — ACETAMINOPHEN 10 MG/ML
1000 INJECTION, SOLUTION INTRAVENOUS EVERY 8 HOURS
Status: DISPENSED | OUTPATIENT
Start: 2018-02-05 | End: 2018-02-06

## 2018-02-05 RX ORDER — METRONIDAZOLE 500 MG/100ML
500 INJECTION, SOLUTION INTRAVENOUS
Status: COMPLETED | OUTPATIENT
Start: 2018-02-05 | End: 2018-02-05

## 2018-02-05 RX ORDER — ENOXAPARIN SODIUM 100 MG/ML
40 INJECTION SUBCUTANEOUS
Status: DISCONTINUED | OUTPATIENT
Start: 2018-02-05 | End: 2018-02-06

## 2018-02-05 RX ORDER — FENTANYL CITRATE 50 UG/ML
25 INJECTION, SOLUTION INTRAMUSCULAR; INTRAVENOUS EVERY 5 MIN PRN
Status: DISCONTINUED | OUTPATIENT
Start: 2018-02-05 | End: 2018-02-05

## 2018-02-05 RX ORDER — HYDRALAZINE HYDROCHLORIDE 20 MG/ML
10 INJECTION INTRAMUSCULAR; INTRAVENOUS EVERY 6 HOURS PRN
Status: DISCONTINUED | OUTPATIENT
Start: 2018-02-05 | End: 2018-02-09 | Stop reason: HOSPADM

## 2018-02-05 RX ORDER — ONDANSETRON 2 MG/ML
4 INJECTION INTRAMUSCULAR; INTRAVENOUS EVERY 6 HOURS PRN
Status: DISCONTINUED | OUTPATIENT
Start: 2018-02-05 | End: 2018-02-09 | Stop reason: HOSPADM

## 2018-02-05 RX ORDER — SODIUM CHLORIDE 0.9 % (FLUSH) 0.9 %
3 SYRINGE (ML) INJECTION
Status: DISCONTINUED | OUTPATIENT
Start: 2018-02-05 | End: 2018-02-05

## 2018-02-05 RX ORDER — GLUCAGON 1 MG
1 KIT INJECTION
Status: DISCONTINUED | OUTPATIENT
Start: 2018-02-05 | End: 2018-02-09 | Stop reason: HOSPADM

## 2018-02-05 RX ORDER — METRONIDAZOLE 500 MG/100ML
500 INJECTION, SOLUTION INTRAVENOUS
Status: COMPLETED | OUTPATIENT
Start: 2018-02-05 | End: 2018-02-06

## 2018-02-05 RX ORDER — ONDANSETRON 2 MG/ML
INJECTION INTRAMUSCULAR; INTRAVENOUS
Status: DISCONTINUED | OUTPATIENT
Start: 2018-02-05 | End: 2018-02-05

## 2018-02-05 RX ORDER — SODIUM CHLORIDE 9 MG/ML
INJECTION, SOLUTION INTRAVENOUS CONTINUOUS
Status: DISCONTINUED | OUTPATIENT
Start: 2018-02-05 | End: 2018-02-06

## 2018-02-05 RX ORDER — DIPHENHYDRAMINE HYDROCHLORIDE 50 MG/ML
12.5 INJECTION INTRAMUSCULAR; INTRAVENOUS EVERY 6 HOURS PRN
Status: DISCONTINUED | OUTPATIENT
Start: 2018-02-05 | End: 2018-02-09 | Stop reason: HOSPADM

## 2018-02-05 RX ORDER — CARVEDILOL 6.25 MG/1
6.25 TABLET ORAL 2 TIMES DAILY
Status: DISCONTINUED | OUTPATIENT
Start: 2018-02-05 | End: 2018-02-09 | Stop reason: HOSPADM

## 2018-02-05 RX ORDER — GLYCOPYRROLATE 0.2 MG/ML
INJECTION INTRAMUSCULAR; INTRAVENOUS
Status: DISCONTINUED | OUTPATIENT
Start: 2018-02-05 | End: 2018-02-05

## 2018-02-05 RX ORDER — INSULIN ASPART 100 [IU]/ML
0-5 INJECTION, SOLUTION INTRAVENOUS; SUBCUTANEOUS EVERY 6 HOURS PRN
Status: DISCONTINUED | OUTPATIENT
Start: 2018-02-05 | End: 2018-02-06

## 2018-02-05 RX ORDER — BUPIVACAINE HYDROCHLORIDE 2.5 MG/ML
INJECTION, SOLUTION EPIDURAL; INFILTRATION; INTRACAUDAL
Status: DISCONTINUED | OUTPATIENT
Start: 2018-02-05 | End: 2018-02-05 | Stop reason: HOSPADM

## 2018-02-05 RX ORDER — FENTANYL CITRATE 50 UG/ML
INJECTION, SOLUTION INTRAMUSCULAR; INTRAVENOUS
Status: DISCONTINUED | OUTPATIENT
Start: 2018-02-05 | End: 2018-02-05

## 2018-02-05 RX ORDER — MUPIROCIN 20 MG/G
1 OINTMENT TOPICAL 2 TIMES DAILY
Status: DISCONTINUED | OUTPATIENT
Start: 2018-02-05 | End: 2018-02-09 | Stop reason: HOSPADM

## 2018-02-05 RX ORDER — SUCCINYLCHOLINE CHLORIDE 20 MG/ML
INJECTION INTRAMUSCULAR; INTRAVENOUS
Status: DISCONTINUED | OUTPATIENT
Start: 2018-02-05 | End: 2018-02-05

## 2018-02-05 RX ORDER — SODIUM CHLORIDE 9 MG/ML
INJECTION, SOLUTION INTRAVENOUS CONTINUOUS
Status: DISCONTINUED | OUTPATIENT
Start: 2018-02-05 | End: 2018-02-05

## 2018-02-05 RX ORDER — LIDOCAINE HCL/PF 100 MG/5ML
SYRINGE (ML) INTRAVENOUS
Status: DISCONTINUED | OUTPATIENT
Start: 2018-02-05 | End: 2018-02-05

## 2018-02-05 RX ORDER — ONDANSETRON 2 MG/ML
4 INJECTION INTRAMUSCULAR; INTRAVENOUS EVERY 12 HOURS PRN
Status: DISCONTINUED | OUTPATIENT
Start: 2018-02-05 | End: 2018-02-05

## 2018-02-05 RX ORDER — LISINOPRIL 20 MG/1
20 TABLET ORAL ONCE
Status: COMPLETED | OUTPATIENT
Start: 2018-02-05 | End: 2018-02-05

## 2018-02-05 RX ADMIN — FENTANYL CITRATE 25 MCG: 50 INJECTION INTRAMUSCULAR; INTRAVENOUS at 12:02

## 2018-02-05 RX ADMIN — IBUPROFEN 800 MG: 800 INJECTION INTRAVENOUS at 09:02

## 2018-02-05 RX ADMIN — LIDOCAINE HYDROCHLORIDE 100 MG: 20 INJECTION, SOLUTION INTRAVENOUS at 07:02

## 2018-02-05 RX ADMIN — CARVEDILOL 6.25 MG: 6.25 TABLET, FILM COATED ORAL at 08:02

## 2018-02-05 RX ADMIN — BUPIVACAINE HYDROCHLORIDE 30 ML: 2.5 INJECTION, SOLUTION EPIDURAL; INFILTRATION; INTRACAUDAL; PERINEURAL at 09:02

## 2018-02-05 RX ADMIN — SODIUM CHLORIDE, SODIUM GLUCONATE, SODIUM ACETATE, POTASSIUM CHLORIDE, MAGNESIUM CHLORIDE, SODIUM PHOSPHATE, DIBASIC, AND POTASSIUM PHOSPHATE: .53; .5; .37; .037; .03; .012; .00082 INJECTION, SOLUTION INTRAVENOUS at 09:02

## 2018-02-05 RX ADMIN — HYDRALAZINE HYDROCHLORIDE 10 MG: 20 INJECTION INTRAMUSCULAR; INTRAVENOUS at 01:02

## 2018-02-05 RX ADMIN — MIDAZOLAM HYDROCHLORIDE 2 MG: 1 INJECTION, SOLUTION INTRAMUSCULAR; INTRAVENOUS at 07:02

## 2018-02-05 RX ADMIN — CEFAZOLIN SODIUM 2 G: 1 POWDER, FOR SOLUTION INTRAMUSCULAR; INTRAVENOUS at 11:02

## 2018-02-05 RX ADMIN — INSULIN ASPART 2 UNITS: 100 INJECTION, SOLUTION INTRAVENOUS; SUBCUTANEOUS at 05:02

## 2018-02-05 RX ADMIN — ROCURONIUM BROMIDE 10 MG: 10 INJECTION, SOLUTION INTRAVENOUS at 08:02

## 2018-02-05 RX ADMIN — NEOSTIGMINE METHYLSULFATE 5 MG: 1 INJECTION INTRAVENOUS at 10:02

## 2018-02-05 RX ADMIN — SUCCINYLCHOLINE CHLORIDE 120 MG: 20 INJECTION, SOLUTION INTRAMUSCULAR; INTRAVENOUS at 07:02

## 2018-02-05 RX ADMIN — ACETAMINOPHEN 1000 MG: 10 INJECTION, SOLUTION INTRAVENOUS at 07:02

## 2018-02-05 RX ADMIN — CEFAZOLIN SODIUM 2 G: 1 POWDER, FOR SOLUTION INTRAMUSCULAR; INTRAVENOUS at 03:02

## 2018-02-05 RX ADMIN — FENTANYL CITRATE 50 MCG: 50 INJECTION, SOLUTION INTRAMUSCULAR; INTRAVENOUS at 08:02

## 2018-02-05 RX ADMIN — BUPIVACAINE 20 ML: 13.3 INJECTION, SUSPENSION, LIPOSOMAL INFILTRATION at 10:02

## 2018-02-05 RX ADMIN — Medication: at 11:02

## 2018-02-05 RX ADMIN — ROCURONIUM BROMIDE 45 MG: 10 INJECTION, SOLUTION INTRAVENOUS at 07:02

## 2018-02-05 RX ADMIN — SODIUM CHLORIDE: 0.9 INJECTION, SOLUTION INTRAVENOUS at 07:02

## 2018-02-05 RX ADMIN — CEFTRIAXONE SODIUM 2 G: 2 INJECTION, POWDER, FOR SOLUTION INTRAMUSCULAR; INTRAVENOUS at 07:02

## 2018-02-05 RX ADMIN — FENTANYL CITRATE 100 MCG: 50 INJECTION, SOLUTION INTRAMUSCULAR; INTRAVENOUS at 07:02

## 2018-02-05 RX ADMIN — HYDRALAZINE HYDROCHLORIDE 10 MG: 20 INJECTION INTRAMUSCULAR; INTRAVENOUS at 08:02

## 2018-02-05 RX ADMIN — SODIUM CHLORIDE: 0.9 INJECTION, SOLUTION INTRAVENOUS at 11:02

## 2018-02-05 RX ADMIN — ROCURONIUM BROMIDE 5 MG: 10 INJECTION, SOLUTION INTRAVENOUS at 07:02

## 2018-02-05 RX ADMIN — IBUPROFEN 800 MG: 800 INJECTION INTRAVENOUS at 07:02

## 2018-02-05 RX ADMIN — METRONIDAZOLE 500 MG: 500 INJECTION, SOLUTION INTRAVENOUS at 04:02

## 2018-02-05 RX ADMIN — ACETAMINOPHEN 1000 MG: 10 INJECTION, SOLUTION INTRAVENOUS at 09:02

## 2018-02-05 RX ADMIN — MUPIROCIN 1 G: 20 OINTMENT TOPICAL at 09:02

## 2018-02-05 RX ADMIN — MORPHINE SULFATE 2 MG: 10 INJECTION INTRAMUSCULAR; INTRAVENOUS; SUBCUTANEOUS at 10:02

## 2018-02-05 RX ADMIN — SODIUM CHLORIDE, SODIUM GLUCONATE, SODIUM ACETATE, POTASSIUM CHLORIDE, MAGNESIUM CHLORIDE, SODIUM PHOSPHATE, DIBASIC, AND POTASSIUM PHOSPHATE: .53; .5; .37; .037; .03; .012; .00082 INJECTION, SOLUTION INTRAVENOUS at 07:02

## 2018-02-05 RX ADMIN — GLYCOPYRROLATE 0.6 MG: 0.2 INJECTION, SOLUTION INTRAMUSCULAR; INTRAVENOUS at 10:02

## 2018-02-05 RX ADMIN — ACETAMINOPHEN 1000 MG: 10 INJECTION, SOLUTION INTRAVENOUS at 01:02

## 2018-02-05 RX ADMIN — PROPOFOL 150 MG: 10 INJECTION, EMULSION INTRAVENOUS at 07:02

## 2018-02-05 RX ADMIN — METRONIDAZOLE 500 MG: 500 SOLUTION INTRAVENOUS at 07:02

## 2018-02-05 RX ADMIN — ENOXAPARIN SODIUM 40 MG: 100 INJECTION SUBCUTANEOUS at 08:02

## 2018-02-05 RX ADMIN — ROCURONIUM BROMIDE 10 MG: 10 INJECTION, SOLUTION INTRAVENOUS at 09:02

## 2018-02-05 RX ADMIN — MUPIROCIN: 20 OINTMENT TOPICAL at 07:02

## 2018-02-05 RX ADMIN — ONDANSETRON 4 MG: 2 INJECTION INTRAMUSCULAR; INTRAVENOUS at 09:02

## 2018-02-05 RX ADMIN — INSULIN ASPART 2 UNITS: 100 INJECTION, SOLUTION INTRAVENOUS; SUBCUTANEOUS at 02:02

## 2018-02-05 RX ADMIN — LISINOPRIL 20 MG: 20 TABLET ORAL at 03:02

## 2018-02-05 RX ADMIN — ONDANSETRON 4 MG: 2 INJECTION INTRAMUSCULAR; INTRAVENOUS at 06:02

## 2018-02-05 NOTE — ANESTHESIA POSTPROCEDURE EVALUATION
"Anesthesia Post Evaluation    Patient: Raegan Trinidad    Procedure(s) Performed: Procedure(s) (LRB):  COLECTOMY-RIGHT, extended (N/A)  HYSTERECTOMY-ABDOMINAL-TOTAL (MARLENE) (Bilateral)  [id number] (BSO) (Bilateral)    Final Anesthesia Type: general  Patient location during evaluation: PACU  Patient participation: Yes- Able to Participate  Level of consciousness: awake and alert and oriented  Post-procedure vital signs: reviewed and stable  Pain management: adequate  Airway patency: patent  PONV status at discharge: No PONV  Anesthetic complications: no      Cardiovascular status: hemodynamically stable and hypertensive  Respiratory status: unassisted, room air and spontaneous ventilation  Hydration status: euvolemic  Follow-up not needed.        Visit Vitals  BP (!) 190/84   Pulse 72   Temp 36.6 °C (97.9 °F) (Oral)   Resp 20   Ht 5' 4.5" (1.638 m)   Wt 107 kg (236 lb)   SpO2 (!) 92%   Breastfeeding? No   BMI 39.88 kg/m²       Pain/Oly Score: Pain Assessment Performed: Yes (2/5/2018 12:00 PM)  Presence of Pain: complains of pain/discomfort (2/5/2018 12:00 PM)  Pain Rating Prior to Med Admin: 7 (2/5/2018  1:13 PM)  Oly Score: 10 (2/5/2018 12:00 PM)      "

## 2018-02-05 NOTE — PROGRESS NOTES
Spoke with Dr. Juarez regarding pt's blood pressure 195/90, now 205/92. Pt states she took her coreg this am, but not lisinopril. Dr. Juarez ordering Hydralazine. Pt denies headache and blurry vision.

## 2018-02-05 NOTE — NURSING TRANSFER
Nursing Transfer Note      2/5/2018     Transfer To: 606 From PACU    Transfer via stretcher    Transfer with IV and PCA pump    Transported by PCT    Medicines sent: none    Chart send with patient: Yes    Notified: family    Patient reassessed at: 2/5/18 @ 1155     Upon arrival to floor:

## 2018-02-05 NOTE — OP NOTE
DATE OF PROCEDURE:  02/05/2018    PREOPERATIVE DIAGNOSIS:  Right ovarian mass.    POSTOPERATIVE DIAGNOSIS:  Uterine fibroids.    PROCEDURE:  Total abdominal hysterectomy with bilateral salpingo-oophorectomy.    SURGEON:  Arsalan Estevez M.D.    FIRST ASSISTANT:  Natalie Rivero M.D. (RES)    ANESTHESIA:  GETA.    OPERATIVE HISTORY:  This is a 53-year-old patient with colon cancer.  She has   already been opened by Dr. Leggett for resection of right hepatic flexure colon   cancer.    I became involved with her care as a referral from Dr. Meliza Stevens in [place], [place].  The patient had been found to have a 7 cm right ovarian cyst.  An   Inhibin B was elevated.  She additionally had a screening colonoscopy and found   to have a hepatic flexure adenocarcinoma of the colon.  A PET scan showed no   evidence for metastatic disease including the ovary being negative.    CT scan had shown a 7 cm right adnexal mass.    OPERATIVE FINDINGS:  The uterus is enlarged.  There are multiple small fibroids.    Both ovaries appear normal.    OPERATIVE PROCEDURE:  At this point, Dr. Leggett has opened the patient.  He has   completed the colon resection and I scrubbed in to do the hysterectomy.    The uterus was grasped with Kimber clamps on either side.  The round ligaments   are transected bilaterally with a Bovie unit.  The anterior leaf of the broad   ligament is opened bilaterally.  Each ureter is identified bilaterally and each   infundibulopelvic ligament is clamped, cut and doubly ligated with 0 Vicryl   ties.    The bladder is dissected downward off of the cervix.  The uterine vessels are   clamped, cut and suture ligated with 0 Vicryl suture.    We continue in a clamp, cut, ligature technique down the sides of the cervix   until we entered into the vagina.  Uterus and cervix were severed from the   vagina.  The angles of the vagina were secured with interrupted 0 Vicryl suture   in a Tacho stitch.  The intervening vagina is  closed with interrupted 0 Vicryl   suture.    At this point, the pelvis is dry.  Dr. Leggett comes in to complete the closure   of the abdomen.          /jocelyn [id number] blank(s)          JACQUI/DMITRI  dd: 02/05/2018 17:01:10 (CST)  td: 02/05/2018 19:54:21 (CST)  Doc ID   #[id number]  Job ID #[id number]    CC:     [id number]

## 2018-02-05 NOTE — PROGRESS NOTES
Spoke with Dr David regarding pain control and blood pressure, MD stated to give 25 mcg of Fentanyl, will continue to monitor.

## 2018-02-05 NOTE — H&P (VIEW-ONLY)
Raegan Trinidad is a 53 y.o. female     Chief Complaint:   Chief Complaint   Patient presents with    Colon Cancer        HPI: Referred by Dr Estevez for right colon cancer.  Patient initially saw Dr. Meliza Stevens with abnormal uterine bleeding.  EMBX was negative. She was given Aygestin for this.  Initially she had a ultrasound done that showed a 7 cm right ovarian cyst.  Tumor markers were done that included a Marilou and inhibin.  The inhibin B was elevated at 155.7 pg/mL. She underwent a screening colonoscopy in November 2017  and was found to ave joel intra-luminal mass in the right hepatic flexure.  A biopsy was consistent with primary adenocarcinoma.  The patient had a PET scan done in Nov 2017  that showed increased FDG uptake in the hepatic flexure with that SUV of 7.8.The pelvic mass was PET negative.      A CT scan of the abdomen and pelvis in Nov 2017 showed a 7 cm right adnexal mass.  Endometrial stripe of 16 mm.    The patient was initially scheduled for a  right hemicolectomy with  Dr. Vivek Vazquez.  She was also seen by Dr. Pancho Canada, a gynecologic oncologist, in [place], [place].  The plan was for a combined procedure with a total abdominal hysterectomy and bilateral salpingo-oophorectomy and right hemicolectomy.     ROS:        Constitutional: No fever, chills, activity or appetite change.      HENT: No hearing loss, facial swelling, neck pain or stiffness.       Eyes: No discharge, itching and visual disturbance.      Respiratory: No apnea, cough, choking or shortness of breath.       Cardiovascular: No leg swelling or chest pain      Gastrointestinal: No abdominal distention or change in bowel habbits     Genitourinary: No dysuria, frequency or flank pain.      Musculoskeletal: No arthralgias or gait problem.      Neurological: No dizziness, seizures or weakness.      Hematological: No adenopathy.      Psychiatric/Behavioral: No hallucinations or behavioral problems.     PMH: Diabetes,  HTN    PE:    APPEARANCE: Well nourished, well developed, in no acute distress.   CHEST: Lungs clear. Normal respiratory effort.  CARDIOVASCULAR: Normal rhythm. No edema.  ABDOMEN: Soft. No tenderness or masses.  Rectum:  Normal skin, NST, no masses or tenderness per Dr Estevez    Musculoskeletal: Symmetric, normal range of motion and strength.   Neurological: Alert and oriented to person, place, and time. Normal reflexes.   Skin: Skin is warm and dry.   Psychiatric: Normal mood and affect. Behavior is normal and appropriate.     Impression: Right colon cancer and adenoxal mass  PLAN: Discussed options. Recommend ext right colectomy and TAHBSO ( by Dr Estevez). Feb 5, 2018  I have explained the procedure including indications, alternatives, expected outcomes and potential complications. The patient appears to understand and gives informed consent. The patient is medically ready for surgery.

## 2018-02-05 NOTE — BRIEF OP NOTE
[facility]  Colon and Rectal Surgery  Operative Note    SUMMARY     Date of Procedure: 2/5/2018     Procedure: Procedure(s) (LRB):  COLECTOMY-RIGHT, extended (N/A)  HYSTERECTOMY-ABDOMINAL-TOTAL (MARLENE) (Bilateral)  [id number] (BSO) (Bilateral)     Surgeon(s) and Role:  Panel 1:     * Miguel Leggett MD - Primary     * Aleshia Mayo MD    Panel 2:     * Natalie Rivero MD - Resident - Assisting     * Arsalan Estevez MD - Primary        Pre-Operative Diagnosis: Cancer of right colon [C18.2]  Essential hypertension [I10]  Ovarian mass, right [N83.9]  Type 2 diabetes mellitus without complication, without long-term current use of insulin [E11.9]    Post-Operative Diagnosis: Post-Op Diagnosis Codes:     * Cancer of right colon [C18.2]     * Essential hypertension [I10]     * Ovarian mass, right [N83.9]     * Type 2 diabetes mellitus without complication, without long-term current use of insulin [E11.9]    Anesthesia: General, Exparel 266 mg, Marcaine 0.25% (75 mg) Saline 10 cc preperironeal injection      Technical Procedures Used: STSFETE LC 75 Blue    Description of the Findings of the Procedure: adhesions, 3 cm mobile mass at hepatic flexure  Significant Surgical Tasks Conducted by the Assistant(s), if Applicable:n/a  Complications: No    Estimated Blood Loss (EBL): 100 mL           Implants: * No implants in log *    Specimens:   Specimen (12h ago through future)    Start     Ordered    Pending  Specimen to Pathology - Surgery  Once     Comments:  1.) Right Colon and Hepatic Flexure- Permanent      Pending           Condition: Good    Disposition: PACU - hemodynamically stable.    Attestation: I was present and scrubbed for the entire procedure.

## 2018-02-05 NOTE — OP NOTE
OPERATIVE NOTE    Preoperative Diagnosis: Adenocarcinoma of the right colon (hepatic flexure) and ovarian mass    Procedure:      1. Open Right Hemicolectomy   2. Extensive Lysis of adhesions 1 hour.  3. Scar revision    Postoperative Diagnosis: Same    Surgery Date: 2/5/2018    Surgeon(s) and Role:    Panel 1:     * Miguel Leggett MD - Primary     * Aleshia Mayo MD    Panel 2:     * Natalie Rivero MD - Resident - Assisting     * Arsalan Estevez MD - Primary    Anesthesia: General   IVF: 2 L   EBL: 200 cc    Specimen: Right colon and hepatic flexure  Findings: Moderate size tumor palpated at the hepatic flexure  Implants/Drains:  None  Complications: None    Indications:    Ms Trinidad is a 54 yo F who was found to have an intraluminal mass on her splenic flexure on November of 2017 biopsy proven primary adenocarcinoma, she also had a pelvic mass of 7 cm which was negative in PET scan.   After discussion of treatment options, risks and benefits of the current procedure, the patient elected to proceed for Open Right Hemicolectomy as well as TAHBSO by Dr. Estevez, she signed informed consent.    Description of Procedure:    The patient was identified by name and date of birth and marked in the preoperative holding area following which she was brought to the operating room and placed on the OR table in Lithotomy position. After a time out, anesthesia was induced without incident. The patient's Perineum was prepped with betadine and the abdomen was prepped with chlorhexidine she was then dressed in standard sterile fashion. Following a final time out, an elliptical midline skin incision was made with a scalpel excising and revising the old laparotomy scar. Upon entry to the abdomen due to prior abdominal surgery moderate amount of adhesions were encountered, we performed about 1 hour of lysis of adhesions.     Once all adhesions were taken down we proceed with palpating the tumor which was located at the hepatic  flexure, so we proceed with a right hemicolectomy. We started with a lateral to medial approach taking down the Colon from the lateral peritoneal wall through the White line of Toldt, the hepatic flexure was taken down by dissecting through the lesser sac above the transverse colon and connecting it with our previous formed plane. We then proceed with division of the Transverse colon with a Stapled PAULINE about 15 cm from the hepatic flexure mass, the ileum was divided in the same fashion about 10 cm from the ileocecal valve. We then proceeded with high ligation of the ileocolic, right colic and right branch of the middle colic artery, this was performed with Kimber clamps, sharply divided with Emerson and vessels tied with 2-0 Chromic, Rest of mesentery was divided in the same fashion. We then handed the specimen to pathology and research.     We continued with our anastomosis in a side to side functional end to end fashion, this was done by aligning the two staple lines side to side, dividing the upper most corner of the the staple line and performing a common enterotomy with a blue load PAULINE stapler, the common enterotomy was then closed in 2 layers using a running Vicryl suture and a 4-0 Ethibond suture in a running Lembert fashion.     The anastomosis looked wide and patent and hemostasis was assured with electrocautery.     Dr. Estevez then proceeded with a TAHBSO which will be dictated in a separate note.     After that, we proceeded to close the abdomen with a running non-looped 0 PDS, wound was then irrigated and skin approximated with 4-0 Monocryl.The skin was then cleaned and dried following which the wound was covered and dressed with Dermabond.     The patient tolerated the procedure well. There were no complications.    Dr. Leggett was present for the entire procedure.     Disposition: Extubated. To PACU then to floor.    Aleshia Perez MD  General Surgery PGY IV  Beeper: [phone]

## 2018-02-05 NOTE — H&P
Raegan Trinidad is a 53 y.o. female      Chief Complaint:       Chief Complaint   Patient presents with    Colon Cancer         HPI: Referred by Dr Estevez for right colon cancer.  Patient initially saw Dr. Meliza Stevens with abnormal uterine bleeding.  EMBX was negative. She was given Aygestin for this.  Initially she had a ultrasound done that showed a 7 cm right ovarian cyst.  Tumor markers were done that included a Stites and inhibin.  The inhibin B was elevated at 155.7 pg/mL. She underwent a screening colonoscopy in November 2017  and was found to ave joel intra-luminal mass in the right hepatic flexure.  A biopsy was consistent with primary adenocarcinoma.  The patient had a PET scan done in Nov 2017  that showed increased FDG uptake in the hepatic flexure with that SUV of 7.8.The pelvic mass was PET negative.      A CT scan of the abdomen and pelvis in Nov 2017 showed a 7 cm right adnexal mass.  Endometrial stripe of 16 mm.     The patient was initially scheduled for a  right hemicolectomy with  Dr. Vivek Vazquez.  She was also seen by Dr. Pancho Canada, a gynecologic oncologist, in [place], [place].  The plan was for a combined procedure with a total abdominal hysterectomy and bilateral salpingo-oophorectomy and right hemicolectomy.      ROS:        Constitutional: No fever, chills, activity or appetite change.      HENT: No hearing loss, facial swelling, neck pain or stiffness.       Eyes: No discharge, itching and visual disturbance.      Respiratory: No apnea, cough, choking or shortness of breath.       Cardiovascular: No leg swelling or chest pain      Gastrointestinal: No abdominal distention or change in bowel habbits     Genitourinary: No dysuria, frequency or flank pain.      Musculoskeletal: No arthralgias or gait problem.      Neurological: No dizziness, seizures or weakness.      Hematological: No adenopathy.      Psychiatric/Behavioral: No hallucinations or behavioral problems.      PMH: Diabetes,  HTN     PE:    APPEARANCE: Well nourished, well developed, in no acute distress.   CHEST: Lungs clear. Normal respiratory effort.  CARDIOVASCULAR: Normal rhythm. No edema.  ABDOMEN: Soft. No tenderness or masses.  Rectum:  Normal skin, NST, no masses or tenderness per Dr Estevez    Musculoskeletal: Symmetric, normal range of motion and strength.   Neurological: Alert and oriented to person, place, and time. Normal reflexes.   Skin: Skin is warm and dry.   Psychiatric: Normal mood and affect. Behavior is normal and appropriate.      Impression: Right colon cancer and adenoxal mass  PLAN: Discussed options. Recommend ext right colectomy and TAHBSO ( by Dr Estevez). Feb 5, 2018  I have explained the procedure including indications, alternatives, expected outcomes and potential complications. The patient appears to understand and gives informed consent. The patient is medically ready for surgery.

## 2018-02-05 NOTE — PLAN OF CARE
Problem: Patient Care Overview  Goal: Plan of Care Review  Outcome: Ongoing (interventions implemented as appropriate)  POC reviewed with pt and pt's aunt, both verbalized understanding. AAOx4. Afebrile. BP remains elevated, PRN hydralazine administered and 1x dose po lisinporil. Pain controlled with PCA. Pt has been resting comfortably. ML with dermabond, scant drainage at navel. Tolerating sips of water. Denies nausea/emesis. Accuchecks Q6H, requiring s/s insulin. Hanna catheter in place, adequate UOP. Family at bedside, call bell within reach, bed low. SCDs on. WCTM.

## 2018-02-05 NOTE — TRANSFER OF CARE
"Anesthesia Transfer of Care Note    Patient: Raegan Trinidad    Procedure(s) Performed: Procedure(s) (LRB):  COLECTOMY-RIGHT, extended (N/A)  HYSTERECTOMY-ABDOMINAL-TOTAL (MARLENE) (Bilateral)  [id number] (BSO) (Bilateral)    Patient location: PACU    Anesthesia Type: general    Transport from OR: Transported from OR on 6-10 L/min O2 by face mask with adequate spontaneous ventilation    Post pain: adequate analgesia    Post assessment: no apparent anesthetic complications and tolerated procedure well    Post vital signs: stable    Level of consciousness: sedated and responds to stimulation    Nausea/Vomiting: no nausea/vomiting    Complications: none    Transfer of care protocol was followed      Last vitals:   Visit Vitals  BP (!) 146/74   Pulse (!) 58   Temp 36.2 °C (97.2 °F) (Temporal)   Resp 17   Ht 5' 4.5" (1.638 m)   Wt 107 kg (236 lb)   SpO2 100%   Breastfeeding? No   BMI 39.88 kg/m²     "

## 2018-02-06 LAB
ANION GAP SERPL CALC-SCNC: 8 MMOL/L
BASOPHILS # BLD AUTO: 0.01 K/UL
BASOPHILS NFR BLD: 0.1 %
BUN SERPL-MCNC: 4 MG/DL
CALCIUM SERPL-MCNC: 8.1 MG/DL
CHLORIDE SERPL-SCNC: 111 MMOL/L
CO2 SERPL-SCNC: 20 MMOL/L
CREAT SERPL-MCNC: 0.7 MG/DL
DIFFERENTIAL METHOD: ABNORMAL
EOSINOPHIL # BLD AUTO: 0 K/UL
EOSINOPHIL NFR BLD: 0.1 %
ERYTHROCYTE [DISTWIDTH] IN BLOOD BY AUTOMATED COUNT: 22.1 %
EST. GFR  (AFRICAN AMERICAN): >60 ML/MIN/1.73 M^2
EST. GFR  (NON AFRICAN AMERICAN): >60 ML/MIN/1.73 M^2
GLUCOSE SERPL-MCNC: 160 MG/DL
HCT VFR BLD AUTO: 30.5 %
HGB BLD-MCNC: 9.8 G/DL
IMM GRANULOCYTES # BLD AUTO: 0.05 K/UL
IMM GRANULOCYTES NFR BLD AUTO: 0.5 %
LYMPHOCYTES # BLD AUTO: 1.2 K/UL
LYMPHOCYTES NFR BLD: 12.1 %
MAGNESIUM SERPL-MCNC: 1.6 MG/DL
MCH RBC QN AUTO: 22.8 PG
MCHC RBC AUTO-ENTMCNC: 32.1 G/DL
MCV RBC AUTO: 71 FL
MONOCYTES # BLD AUTO: 0.8 K/UL
MONOCYTES NFR BLD: 8 %
NEUTROPHILS # BLD AUTO: 7.9 K/UL
NEUTROPHILS NFR BLD: 79.2 %
NRBC BLD-RTO: 0 /100 WBC
PHOSPHATE SERPL-MCNC: 2.5 MG/DL
PLATELET # BLD AUTO: 288 K/UL
PMV BLD AUTO: 11.7 FL
POCT GLUCOSE: 172 MG/DL (ref 70–110)
POCT GLUCOSE: 181 MG/DL (ref 70–110)
POCT GLUCOSE: 209 MG/DL (ref 70–110)
POCT GLUCOSE: 219 MG/DL (ref 70–110)
POCT GLUCOSE: 226 MG/DL (ref 70–110)
POTASSIUM SERPL-SCNC: 3.4 MMOL/L
RBC # BLD AUTO: 4.3 M/UL
SODIUM SERPL-SCNC: 139 MMOL/L
WBC # BLD AUTO: 9.96 K/UL

## 2018-02-06 PROCEDURE — S0030 INJECTION, METRONIDAZOLE: HCPCS | Performed by: STUDENT IN AN ORGANIZED HEALTH CARE EDUCATION/TRAINING PROGRAM

## 2018-02-06 PROCEDURE — 99024 POSTOP FOLLOW-UP VISIT: CPT | Mod: ,,, | Performed by: OBSTETRICS & GYNECOLOGY

## 2018-02-06 PROCEDURE — 63600175 PHARM REV CODE 636 W HCPCS: Performed by: STUDENT IN AN ORGANIZED HEALTH CARE EDUCATION/TRAINING PROGRAM

## 2018-02-06 PROCEDURE — 84100 ASSAY OF PHOSPHORUS: CPT

## 2018-02-06 PROCEDURE — 25000003 PHARM REV CODE 250: Performed by: STUDENT IN AN ORGANIZED HEALTH CARE EDUCATION/TRAINING PROGRAM

## 2018-02-06 PROCEDURE — 83735 ASSAY OF MAGNESIUM: CPT

## 2018-02-06 PROCEDURE — 36415 COLL VENOUS BLD VENIPUNCTURE: CPT

## 2018-02-06 PROCEDURE — 85025 COMPLETE CBC W/AUTO DIFF WBC: CPT

## 2018-02-06 PROCEDURE — 20600001 HC STEP DOWN PRIVATE ROOM

## 2018-02-06 PROCEDURE — 80048 BASIC METABOLIC PNL TOTAL CA: CPT

## 2018-02-06 PROCEDURE — S5010 5% DEXTROSE AND 0.45% SALINE: HCPCS | Performed by: STUDENT IN AN ORGANIZED HEALTH CARE EDUCATION/TRAINING PROGRAM

## 2018-02-06 RX ORDER — ENOXAPARIN SODIUM 100 MG/ML
40 INJECTION SUBCUTANEOUS
Status: DISCONTINUED | OUTPATIENT
Start: 2018-02-06 | End: 2018-02-09 | Stop reason: HOSPADM

## 2018-02-06 RX ORDER — DEXTROSE MONOHYDRATE AND SODIUM CHLORIDE 5; .45 G/100ML; G/100ML
INJECTION, SOLUTION INTRAVENOUS CONTINUOUS
Status: DISCONTINUED | OUTPATIENT
Start: 2018-02-06 | End: 2018-02-08

## 2018-02-06 RX ORDER — INSULIN ASPART 100 [IU]/ML
1-10 INJECTION, SOLUTION INTRAVENOUS; SUBCUTANEOUS
Status: DISCONTINUED | OUTPATIENT
Start: 2018-02-06 | End: 2018-02-09 | Stop reason: HOSPADM

## 2018-02-06 RX ORDER — INSULIN ASPART 100 [IU]/ML
1-10 INJECTION, SOLUTION INTRAVENOUS; SUBCUTANEOUS EVERY 6 HOURS PRN
Status: DISCONTINUED | OUTPATIENT
Start: 2018-02-06 | End: 2018-02-06

## 2018-02-06 RX ORDER — INSULIN ASPART 100 [IU]/ML
0-5 INJECTION, SOLUTION INTRAVENOUS; SUBCUTANEOUS
Status: DISCONTINUED | OUTPATIENT
Start: 2018-02-06 | End: 2018-02-06

## 2018-02-06 RX ORDER — GLUCAGON 1 MG
1 KIT INJECTION
Status: DISCONTINUED | OUTPATIENT
Start: 2018-02-06 | End: 2018-02-09 | Stop reason: HOSPADM

## 2018-02-06 RX ORDER — MAGNESIUM SULFATE HEPTAHYDRATE 40 MG/ML
2 INJECTION, SOLUTION INTRAVENOUS ONCE
Status: COMPLETED | OUTPATIENT
Start: 2018-02-06 | End: 2018-02-06

## 2018-02-06 RX ADMIN — HYDRALAZINE HYDROCHLORIDE 10 MG: 20 INJECTION INTRAMUSCULAR; INTRAVENOUS at 09:02

## 2018-02-06 RX ADMIN — MAGNESIUM SULFATE IN WATER 2 G: 40 INJECTION, SOLUTION INTRAVENOUS at 08:02

## 2018-02-06 RX ADMIN — MUPIROCIN 1 G: 20 OINTMENT TOPICAL at 09:02

## 2018-02-06 RX ADMIN — INSULIN ASPART 2 UNITS: 100 INJECTION, SOLUTION INTRAVENOUS; SUBCUTANEOUS at 05:02

## 2018-02-06 RX ADMIN — MUPIROCIN 1 G: 20 OINTMENT TOPICAL at 08:02

## 2018-02-06 RX ADMIN — LISINOPRIL 30 MG: 20 TABLET ORAL at 08:02

## 2018-02-06 RX ADMIN — DEXTROSE AND SODIUM CHLORIDE: 5; .45 INJECTION, SOLUTION INTRAVENOUS at 08:02

## 2018-02-06 RX ADMIN — CARVEDILOL 6.25 MG: 6.25 TABLET, FILM COATED ORAL at 08:02

## 2018-02-06 RX ADMIN — ENOXAPARIN SODIUM 40 MG: 100 INJECTION SUBCUTANEOUS at 06:02

## 2018-02-06 RX ADMIN — DEXTROSE AND SODIUM CHLORIDE: 5; .45 INJECTION, SOLUTION INTRAVENOUS at 05:02

## 2018-02-06 RX ADMIN — IBUPROFEN 800 MG: 800 INJECTION INTRAVENOUS at 05:02

## 2018-02-06 RX ADMIN — DEXTROSE AND SODIUM CHLORIDE: 5; .45 INJECTION, SOLUTION INTRAVENOUS at 07:02

## 2018-02-06 RX ADMIN — IBUPROFEN 800 MG: 800 INJECTION INTRAVENOUS at 09:02

## 2018-02-06 RX ADMIN — METRONIDAZOLE 500 MG: 500 INJECTION, SOLUTION INTRAVENOUS at 12:02

## 2018-02-06 RX ADMIN — IBUPROFEN 800 MG: 800 INJECTION INTRAVENOUS at 02:02

## 2018-02-06 RX ADMIN — ONDANSETRON 4 MG: 2 INJECTION INTRAMUSCULAR; INTRAVENOUS at 08:02

## 2018-02-06 RX ADMIN — INSULIN ASPART 2 UNITS: 100 INJECTION, SOLUTION INTRAVENOUS; SUBCUTANEOUS at 11:02

## 2018-02-06 RX ADMIN — ONDANSETRON 4 MG: 2 INJECTION INTRAMUSCULAR; INTRAVENOUS at 09:02

## 2018-02-06 RX ADMIN — INSULIN ASPART 2 UNITS: 100 INJECTION, SOLUTION INTRAVENOUS; SUBCUTANEOUS at 12:02

## 2018-02-06 NOTE — PLAN OF CARE
Problem: Patient Care Overview  Goal: Plan of Care Review  Outcome: Ongoing (interventions implemented as appropriate)  Pt AAOx4. Pt tolerating SF clear liquid diet with no complaints of discomfort or nausea. Pain managed with morphine PCA. /89, 10mg hydralazine and scheduled coreg given, BP  163/70 and systolic remained <160 throughout night. Hanna catheter intact and patent, to be d/c'd this am. Family at bedside. Call light in reach and bed in lowest position. SCDs on.  Pt remains free of falls and injury. No acute events this shift. Will continue to monitor.

## 2018-02-06 NOTE — PLAN OF CARE
Problem: Patient Care Overview  Goal: Plan of Care Review  Outcome: Ongoing (interventions implemented as appropriate)  POC reviewed with pt and pt's aunt. Both verbalized understanding. Midline with dermabond. Abdominal binder in place. x1 nausea this am, Zofran given and no nausea since. No emesis. + belching. - flatus. - bowel movement. Up ad oliver. Walking in halls. Voided post worthy catheter removal. Pt is having small amount of discharge from vagina, pad applied. PCA controlling pain. Tolerating clear liquids. accuchecks Q6H, s/s coverage required.  SCDs on. Pt's aunt at bedside. Mg replaced today. No falls or injuries, call bell within reach. WCTM.

## 2018-02-06 NOTE — PROGRESS NOTES
[facility]  Gynecologic Oncology  Progress Note      Patient Name: Raegan Trinidad  MRN: [id number]  Admission Date: 2/5/2018  Hospital Length of Stay: 1 days  Attending Provider: Miguel Leggett MD  Primary Care Provider: BEN Watson MD  Principal Problem: Cancer of right colon    Follow-up For: Procedure(s) (LRB):  COLECTOMY-RIGHT, extended (N/A)  HYSTERECTOMY-ABDOMINAL-TOTAL (MARLENE) (Bilateral)  [id number] (BSO) (Bilateral)  Post-Operative Day: 1 Day Post-Op  Subjective:      History of Present Illness:  Patient is a 52yo with h/o PMB and an adnexal mass who presents for MARLENE/BSO in conjunction with CRS bowel resection due to colon cancer.      She underwent a screening colonoscopy in November 2017  and was found to ave joel intra-luminal mass in the right hepatic flexure.  A biopsy was consistent with primary adenocarcinoma.  The patient had a PET scan done in Nov 2017  that showed increased FDG uptake in the hepatic flexure with that SUV of 7.8.The pelvic mass was PET negative.      A CT scan of the abdomen and pelvis in Nov 2017 showed a 7 cm right adnexal mass.  Endometrial stripe of 16 mm.    Hospital Course:  POD #1: Patient doing well postoperatively.     Interval History: POD #1 s/p R colectomy/MARLENE/BSO. Patient doing well postoperatively, although she had one episode of vomiting overnight.     Scheduled Meds:   acetaminophen  1,000 mg Intravenous Q8H    carvedilol  6.25 mg Oral BID    enoxparin  40 mg Subcutaneous Q24H    ibuprofen  800 mg Intravenous Q8H    lisinopril  30 mg Oral Daily    mupirocin  1 g Nasal BID     Continuous Infusions:   sodium chloride 0.9% 125 mL/hr at 02/05/18 1101    morphine       PRN Meds:dextrose 50%, diphenhydrAMINE, glucagon (human recombinant), hydrALAZINE, insulin aspart, naloxone, ondansetron, promethazine (PHENERGAN) IVPB    Review of patient's allergies indicates:  No Known Allergies    Objective:     Vital Signs (Most  Recent):  Temp: 98.1 °F (36.7 °C) (02/06/18 0426)  Pulse: 85 (02/06/18 0426)  Resp: 18 (02/06/18 0426)  BP: (!) 144/98 (02/06/18 0426)  SpO2: 99 % (02/06/18 0426) Vital Signs (24h Range):  Temp:  [97.2 °F (36.2 °C)-98.5 °F (36.9 °C)] 98.1 °F (36.7 °C)  Pulse:  [51-89] 85  Resp:  [16-24] 18  SpO2:  [92 %-100 %] 99 %  BP: (139-215)/(57-98) 144/98     Weight: 107 kg (235 lb 14.3 oz)  Body mass index is 39.87 kg/m².    Intake/Output - Last 3 Shifts       02/04 0700 - 02/05 0659 02/05 0700 - 02/06 0659    P.O.  390    I.V. (mL/kg)  4342.5 (40.6)    IV Piggyback  650    Total Intake(mL/kg)  5382.5 (50.3)    Urine (mL/kg/hr)  1370 (0.5)    Emesis/NG output  150 (0.1)    Other  0 (0)    Stool  0 (0)    Blood  250 (0.1)    Total Output   1770    Net   +3612.5          Urine Occurrence  0 x    Stool Occurrence  0 x    Emesis Occurrence  0 x             Physical Exam:   Constitutional: She is oriented to person, place, and time. She appears well-developed and well-nourished. No distress.    HENT:   Head: Atraumatic.    Eyes: EOM are normal. Pupils are equal, round, and reactive to light.    Neck: Normal range of motion. Neck supple.    Cardiovascular: Normal rate and regular rhythm.     Pulmonary/Chest: Effort normal. No respiratory distress. She has no wheezes.        Abdominal: Soft. She exhibits abdominal incision (c/d/i). She exhibits no distension. There is no tenderness.             Musculoskeletal: Normal range of motion. She exhibits no edema or tenderness.       Neurological: She is alert and oriented to person, place, and time. She has normal reflexes.    Skin: Skin is warm and dry. She is not diaphoretic.        Lines/Drains/Airways     Drain                 Urethral Catheter 02/05/18 0735 Non-latex 16 Fr. less than 1 day          Peripheral Intravenous Line                 Peripheral IV - Single Lumen 02/05/18 0703 Left Forearm less than 1 day         Peripheral IV - Single Lumen 02/05/18 0733 Right Hand less than  1 day                Laboratory:  Recent Lab Results       02/05/18  2326 02/05/18  1756 02/05/18  1332 02/05/18  1030 02/05/18  0709      POCT Glucose 172(H) 215(H) 238(H) 205(H)      Preg Test, Ur     Negative      Acceptable     Yes                 02/05/18  0700      POCT Glucose 170(H)     Preg Test, Ur       Acceptable                Assessment/Plan:     * Cancer of right colon    - S/p hemicolectomy  - Postoperative management including diet per CRS        Ovarian mass, right    - Doing well postoperatively  - On clear liquid diet  - OK to remove Worthy today  - Continue current management per CRS          VTE Risk Mitigation         Ordered     enoxaparin injection 40 mg  Every 24 hours (non-standard times)     Route:  Subcutaneous        02/05/18 1037     Medium Risk of VTE  Once      02/05/18 1039          Was worthy catheter removed? No: POD #1    Donta Campos MD  Gynecologic Oncology  [facility]

## 2018-02-06 NOTE — HOSPITAL COURSE
POD #1-2: Patient doing well postoperatively. No nausea or vomiting. Continue management per CRS.

## 2018-02-06 NOTE — HPI
Patient is a 52yo with h/o PMB and an adnexal mass who presents for MARLENE/BSO in conjunction with CRS bowel resection due to colon cancer.      She underwent a screening colonoscopy in November 2017  and was found to ave joel intra-luminal mass in the right hepatic flexure.  A biopsy was consistent with primary adenocarcinoma.  The patient had a PET scan done in Nov 2017  that showed increased FDG uptake in the hepatic flexure with that SUV of 7.8.The pelvic mass was PET negative.      A CT scan of the abdomen and pelvis in Nov 2017 showed a 7 cm right adnexal mass.  Endometrial stripe of 16 mm.

## 2018-02-06 NOTE — ASSESSMENT & PLAN NOTE
- Doing well postoperatively  - On clear liquid diet  - OK to remove Hanna today  - Continue current management per CRS

## 2018-02-06 NOTE — PROGRESS NOTES
[facility]  Colorectal Surgery  Progress Note    Patient Name: Raegan Trinidad  MRN: [id number]  Admission Date: 2/5/2018  Hospital Length of Stay: 1 days  Attending Physician: Miguel Leggett MD    Subjective:     Interval History: No acute events overnight, one episode of emesis yesterday immediately postop. Tolerating clears. No flatus or BMs.    Post-Op Info:  Procedure(s) (LRB):  COLECTOMY-RIGHT, extended (N/A)  HYSTERECTOMY-ABDOMINAL-TOTAL (MARLENE) (Bilateral)  [id number]-[id number] (BSO) (Bilateral)   1 Day Post-Op      Medications:  Continuous Infusions:   dextrose 5 % and 0.45 % NaCl      morphine       Scheduled Meds:   acetaminophen  1,000 mg Intravenous Q8H    carvedilol  6.25 mg Oral BID    enoxparin  40 mg Subcutaneous Q24H    ibuprofen  800 mg Intravenous Q8H    lisinopril  30 mg Oral Daily    magnesium sulfate IVPB  2 g Intravenous Once    mupirocin  1 g Nasal BID     PRN Meds:   dextrose 50%    diphenhydrAMINE    glucagon (human recombinant)    hydrALAZINE    insulin aspart    naloxone    ondansetron    promethazine (PHENERGAN) IVPB        Objective:     Vital Signs (Most Recent):  Temp: 98.1 °F (36.7 °C) (02/06/18 0426)  Pulse: 85 (02/06/18 0426)  Resp: 18 (02/06/18 0426)  BP: (!) 144/98 (02/06/18 0426)  SpO2: 99 % (02/06/18 0426) Vital Signs (24h Range):  Temp:  [97.2 °F (36.2 °C)-98.5 °F (36.9 °C)] 98.1 °F (36.7 °C)  Pulse:  [51-89] 85  Resp:  [16-22] 18  SpO2:  [92 %-100 %] 99 %  BP: (144-215)/(70-98) 144/98     Intake/Output - Last 3 Shifts       02/04 0700 - 02/05 0659 02/05 0700 - 02/06 0659 02/06 0700 - 02/07 0659    P.O.  390     I.V. (mL/kg)  4342.5 (40.6)     IV Piggyback  650     Total Intake(mL/kg)  5382.5 (50.3)     Urine (mL/kg/hr)  1470 (0.6)     Emesis/NG output  150 (0.1)     Other  0 (0)     Stool  0 (0)     Blood  250 (0.1)     Total Output   1870      Net   +3512.5             Urine Occurrence  0 x     Stool Occurrence  0 x     Emesis  Occurrence  0 x           Physical Exam   Constitutional: She appears well-developed. No distress.   HENT:   Head: Normocephalic and atraumatic.   Eyes: Conjunctivae are normal.   Neck: Normal range of motion.   Cardiovascular: Normal rate, regular rhythm and intact distal pulses.    Pulmonary/Chest: Effort normal. No respiratory distress.   Abdominal: Soft. She exhibits no distension and no mass. There is tenderness. There is no guarding.   Midline incision CDI      Skin: She is not diaphoretic.   Nursing note and vitals reviewed.        Significant Labs:  CBC (Last 3 Results):   Recent Labs  Lab 02/06/18  0554   WBC 9.96   RBC 4.30   HGB 9.8*   HCT 30.5*      MCV 71*   MCH 22.8*   MCHC 32.1     CMP (Last 3 Results):   Recent Labs  Lab 02/06/18  0554   *   CALCIUM 8.1*      K 3.4*   CO2 20*   *   BUN 4*   CREATININE 0.7       Significant Diagnostics:  I have reviewed all pertinent imaging results/findings within the past 24 hours.    Assessment/Plan:     * Cancer of right colon    54 yo F 1 Day Post-Op extended right hemicolectomy and MARLENE/BSO by gyn onc,    -Clear liquids  -PCA continue today/ IV tylenol/ ibuprofen   -OOB ambulate, PT/OT  -Daily labs  -DVT/GI ppx   -D/C deacon Juarez MD  Colorectal Surgery  [facility]

## 2018-02-06 NOTE — PROGRESS NOTES
Pt had 1 episode of emesis after eating broth, ~150 ml. Zofran given. Abdominal binder applied. Pt sat up in chair 2 hours. Dr. Mayo notified.

## 2018-02-06 NOTE — SUBJECTIVE & OBJECTIVE
Subjective:     Interval History: No acute events overnight, one episode of emesis yesterday immediately postop. Tolerating clears. No flatus or BMs.    Post-Op Info:  Procedure(s) (LRB):  COLECTOMY-RIGHT, extended (N/A)  HYSTERECTOMY-ABDOMINAL-TOTAL (MARLENE) (Bilateral)  [id number]-[id number] (BSO) (Bilateral)   1 Day Post-Op      Medications:  Continuous Infusions:   dextrose 5 % and 0.45 % NaCl      morphine       Scheduled Meds:   acetaminophen  1,000 mg Intravenous Q8H    carvedilol  6.25 mg Oral BID    enoxparin  40 mg Subcutaneous Q24H    ibuprofen  800 mg Intravenous Q8H    lisinopril  30 mg Oral Daily    magnesium sulfate IVPB  2 g Intravenous Once    mupirocin  1 g Nasal BID     PRN Meds:   dextrose 50%    diphenhydrAMINE    glucagon (human recombinant)    hydrALAZINE    insulin aspart    naloxone    ondansetron    promethazine (PHENERGAN) IVPB        Objective:     Vital Signs (Most Recent):  Temp: 98.1 °F (36.7 °C) (02/06/18 0426)  Pulse: 85 (02/06/18 0426)  Resp: 18 (02/06/18 0426)  BP: (!) 144/98 (02/06/18 0426)  SpO2: 99 % (02/06/18 0426) Vital Signs (24h Range):  Temp:  [97.2 °F (36.2 °C)-98.5 °F (36.9 °C)] 98.1 °F (36.7 °C)  Pulse:  [51-89] 85  Resp:  [16-22] 18  SpO2:  [92 %-100 %] 99 %  BP: (144-215)/(70-98) 144/98     Intake/Output - Last 3 Shifts       02/04 0700 - 02/05 0659 02/05 0700 - 02/06 0659 02/06 0700 - 02/07 0659    P.O.  390     I.V. (mL/kg)  4342.5 (40.6)     IV Piggyback  650     Total Intake(mL/kg)  5382.5 (50.3)     Urine (mL/kg/hr)  1470 (0.6)     Emesis/NG output  150 (0.1)     Other  0 (0)     Stool  0 (0)     Blood  250 (0.1)     Total Output   1870      Net   +3512.5             Urine Occurrence  0 x     Stool Occurrence  0 x     Emesis Occurrence  0 x           Physical Exam   Constitutional: She appears well-developed. No distress.   HENT:   Head: Normocephalic and atraumatic.   Eyes: Conjunctivae are normal.   Neck: Normal range of motion.    Cardiovascular: Normal rate, regular rhythm and intact distal pulses.    Pulmonary/Chest: Effort normal. No respiratory distress.   Abdominal: Soft. She exhibits no distension and no mass. There is tenderness. There is no guarding.   Midline incision CDI      Skin: She is not diaphoretic.   Nursing note and vitals reviewed.        Significant Labs:  CBC (Last 3 Results):   Recent Labs  Lab 02/06/18  0554   WBC 9.96   RBC 4.30   HGB 9.8*   HCT 30.5*      MCV 71*   MCH 22.8*   MCHC 32.1     CMP (Last 3 Results):   Recent Labs  Lab 02/06/18  0554   *   CALCIUM 8.1*      K 3.4*   CO2 20*   *   BUN 4*   CREATININE 0.7       Significant Diagnostics:  I have reviewed all pertinent imaging results/findings within the past 24 hours.

## 2018-02-06 NOTE — SUBJECTIVE & OBJECTIVE
Interval History: POD #1 s/p R colectomy/MARLENE/BSO. Patient doing well postoperatively, although she had one episode of vomiting overnight.     Scheduled Meds:   acetaminophen  1,000 mg Intravenous Q8H    carvedilol  6.25 mg Oral BID    enoxparin  40 mg Subcutaneous Q24H    ibuprofen  800 mg Intravenous Q8H    lisinopril  30 mg Oral Daily    mupirocin  1 g Nasal BID     Continuous Infusions:   sodium chloride 0.9% 125 mL/hr at 02/05/18 1101    morphine       PRN Meds:dextrose 50%, diphenhydrAMINE, glucagon (human recombinant), hydrALAZINE, insulin aspart, naloxone, ondansetron, promethazine (PHENERGAN) IVPB    Review of patient's allergies indicates:  No Known Allergies    Objective:     Vital Signs (Most Recent):  Temp: 98.1 °F (36.7 °C) (02/06/18 0426)  Pulse: 85 (02/06/18 0426)  Resp: 18 (02/06/18 0426)  BP: (!) 144/98 (02/06/18 0426)  SpO2: 99 % (02/06/18 0426) Vital Signs (24h Range):  Temp:  [97.2 °F (36.2 °C)-98.5 °F (36.9 °C)] 98.1 °F (36.7 °C)  Pulse:  [51-89] 85  Resp:  [16-24] 18  SpO2:  [92 %-100 %] 99 %  BP: (139-215)/(57-98) 144/98     Weight: 107 kg (235 lb 14.3 oz)  Body mass index is 39.87 kg/m².    Intake/Output - Last 3 Shifts       02/04 0700 - 02/05 0659 02/05 0700 - 02/06 0659    P.O.  390    I.V. (mL/kg)  4342.5 (40.6)    IV Piggyback  650    Total Intake(mL/kg)  5382.5 (50.3)    Urine (mL/kg/hr)  1370 (0.5)    Emesis/NG output  150 (0.1)    Other  0 (0)    Stool  0 (0)    Blood  250 (0.1)    Total Output   1770    Net   +3612.5          Urine Occurrence  0 x    Stool Occurrence  0 x    Emesis Occurrence  0 x             Physical Exam:   Constitutional: She is oriented to person, place, and time. She appears well-developed and well-nourished. No distress.    HENT:   Head: Atraumatic.    Eyes: EOM are normal. Pupils are equal, round, and reactive to light.    Neck: Normal range of motion. Neck supple.    Cardiovascular: Normal rate and regular rhythm.     Pulmonary/Chest: Effort normal.  No respiratory distress. She has no wheezes.        Abdominal: Soft. She exhibits abdominal incision (c/d/i). She exhibits no distension. There is no tenderness.             Musculoskeletal: Normal range of motion. She exhibits no edema or tenderness.       Neurological: She is alert and oriented to person, place, and time. She has normal reflexes.    Skin: Skin is warm and dry. She is not diaphoretic.        Lines/Drains/Airways     Drain                 Urethral Catheter 02/05/18 0735 Non-latex 16 Fr. less than 1 day          Peripheral Intravenous Line                 Peripheral IV - Single Lumen 02/05/18 0703 Left Forearm less than 1 day         Peripheral IV - Single Lumen 02/05/18 0733 Right Hand less than 1 day                Laboratory:  Recent Lab Results       02/05/18  2326 02/05/18  1756 02/05/18  1332 02/05/18  1030 02/05/18  0709      POCT Glucose 172(H) 215(H) 238(H) 205(H)      Preg Test, Ur     Negative      Acceptable     Yes                 02/05/18  0700      POCT Glucose 170(H)     Preg Test, Ur       Acceptable

## 2018-02-06 NOTE — ASSESSMENT & PLAN NOTE
54 yo F 1 Day Post-Op extended right hemicolectomy and MARLENE/BSO by gyn onc,    -Clear liquids  -PCA continue today/ IV tylenol/ ibuprofen   -OOB ambulate, PT/OT  -Daily labs  -DVT/GI ppx   -D/C worthy

## 2018-02-07 LAB
ANION GAP SERPL CALC-SCNC: 7 MMOL/L
BASOPHILS # BLD AUTO: 0.02 K/UL
BASOPHILS NFR BLD: 0.2 %
BUN SERPL-MCNC: 2 MG/DL
CALCIUM SERPL-MCNC: 8.4 MG/DL
CHLORIDE SERPL-SCNC: 108 MMOL/L
CO2 SERPL-SCNC: 23 MMOL/L
CREAT SERPL-MCNC: 0.8 MG/DL
DIFFERENTIAL METHOD: ABNORMAL
EOSINOPHIL # BLD AUTO: 0.1 K/UL
EOSINOPHIL NFR BLD: 0.6 %
ERYTHROCYTE [DISTWIDTH] IN BLOOD BY AUTOMATED COUNT: 21.9 %
EST. GFR  (AFRICAN AMERICAN): >60 ML/MIN/1.73 M^2
EST. GFR  (NON AFRICAN AMERICAN): >60 ML/MIN/1.73 M^2
ESTIMATED AVG GLUCOSE: 146 MG/DL
GLUCOSE SERPL-MCNC: 207 MG/DL
HBA1C MFR BLD HPLC: 6.7 %
HCT VFR BLD AUTO: 27.2 %
HGB BLD-MCNC: 8.9 G/DL
IMM GRANULOCYTES # BLD AUTO: 0.02 K/UL
IMM GRANULOCYTES NFR BLD AUTO: 0.2 %
LYMPHOCYTES # BLD AUTO: 1.2 K/UL
LYMPHOCYTES NFR BLD: 14.1 %
MAGNESIUM SERPL-MCNC: 1.8 MG/DL
MCH RBC QN AUTO: 23.1 PG
MCHC RBC AUTO-ENTMCNC: 32.7 G/DL
MCV RBC AUTO: 71 FL
MONOCYTES # BLD AUTO: 0.8 K/UL
MONOCYTES NFR BLD: 9 %
NEUTROPHILS # BLD AUTO: 6.7 K/UL
NEUTROPHILS NFR BLD: 75.9 %
NRBC BLD-RTO: 0 /100 WBC
PHOSPHATE SERPL-MCNC: 2 MG/DL
PLATELET # BLD AUTO: 244 K/UL
PMV BLD AUTO: 12.2 FL
POCT GLUCOSE: 140 MG/DL (ref 70–110)
POCT GLUCOSE: 157 MG/DL (ref 70–110)
POCT GLUCOSE: 189 MG/DL (ref 70–110)
POCT GLUCOSE: 205 MG/DL (ref 70–110)
POTASSIUM SERPL-SCNC: 3.2 MMOL/L
RBC # BLD AUTO: 3.86 M/UL
SODIUM SERPL-SCNC: 138 MMOL/L
WBC # BLD AUTO: 8.82 K/UL

## 2018-02-07 PROCEDURE — 20600001 HC STEP DOWN PRIVATE ROOM

## 2018-02-07 PROCEDURE — 83036 HEMOGLOBIN GLYCOSYLATED A1C: CPT

## 2018-02-07 PROCEDURE — 80048 BASIC METABOLIC PNL TOTAL CA: CPT

## 2018-02-07 PROCEDURE — 36415 COLL VENOUS BLD VENIPUNCTURE: CPT

## 2018-02-07 PROCEDURE — 83735 ASSAY OF MAGNESIUM: CPT

## 2018-02-07 PROCEDURE — 63600175 PHARM REV CODE 636 W HCPCS: Performed by: STUDENT IN AN ORGANIZED HEALTH CARE EDUCATION/TRAINING PROGRAM

## 2018-02-07 PROCEDURE — 84100 ASSAY OF PHOSPHORUS: CPT

## 2018-02-07 PROCEDURE — S5010 5% DEXTROSE AND 0.45% SALINE: HCPCS | Performed by: STUDENT IN AN ORGANIZED HEALTH CARE EDUCATION/TRAINING PROGRAM

## 2018-02-07 PROCEDURE — 99024 POSTOP FOLLOW-UP VISIT: CPT | Mod: ,,, | Performed by: OBSTETRICS & GYNECOLOGY

## 2018-02-07 PROCEDURE — 85025 COMPLETE CBC W/AUTO DIFF WBC: CPT

## 2018-02-07 PROCEDURE — 25000003 PHARM REV CODE 250: Performed by: STUDENT IN AN ORGANIZED HEALTH CARE EDUCATION/TRAINING PROGRAM

## 2018-02-07 RX ORDER — LANOLIN ALCOHOL/MO/W.PET/CERES
400 CREAM (GRAM) TOPICAL ONCE
Status: COMPLETED | OUTPATIENT
Start: 2018-02-07 | End: 2018-02-07

## 2018-02-07 RX ORDER — ACETAMINOPHEN 325 MG/1
650 TABLET ORAL EVERY 6 HOURS
Status: DISCONTINUED | OUTPATIENT
Start: 2018-02-07 | End: 2018-02-09 | Stop reason: HOSPADM

## 2018-02-07 RX ORDER — POTASSIUM CHLORIDE 20 MEQ/1
60 TABLET, EXTENDED RELEASE ORAL ONCE
Status: COMPLETED | OUTPATIENT
Start: 2018-02-07 | End: 2018-02-07

## 2018-02-07 RX ORDER — OXYCODONE HYDROCHLORIDE 5 MG/1
10 TABLET ORAL EVERY 4 HOURS PRN
Status: DISCONTINUED | OUTPATIENT
Start: 2018-02-07 | End: 2018-02-09 | Stop reason: HOSPADM

## 2018-02-07 RX ORDER — OXYCODONE HYDROCHLORIDE 5 MG/1
5 TABLET ORAL EVERY 4 HOURS PRN
Status: DISCONTINUED | OUTPATIENT
Start: 2018-02-07 | End: 2018-02-09 | Stop reason: HOSPADM

## 2018-02-07 RX ORDER — SODIUM,POTASSIUM PHOSPHATES 280-250MG
1 POWDER IN PACKET (EA) ORAL ONCE
Status: COMPLETED | OUTPATIENT
Start: 2018-02-07 | End: 2018-02-07

## 2018-02-07 RX ADMIN — DEXTROSE AND SODIUM CHLORIDE: 5; .45 INJECTION, SOLUTION INTRAVENOUS at 04:02

## 2018-02-07 RX ADMIN — POTASSIUM & SODIUM PHOSPHATES POWDER PACK 280-160-250 MG 1 PACKET: 280-160-250 PACK at 09:02

## 2018-02-07 RX ADMIN — CARVEDILOL 6.25 MG: 6.25 TABLET, FILM COATED ORAL at 08:02

## 2018-02-07 RX ADMIN — CARVEDILOL 6.25 MG: 6.25 TABLET, FILM COATED ORAL at 10:02

## 2018-02-07 RX ADMIN — MUPIROCIN 1 G: 20 OINTMENT TOPICAL at 09:02

## 2018-02-07 RX ADMIN — INSULIN ASPART 4 UNITS: 100 INJECTION, SOLUTION INTRAVENOUS; SUBCUTANEOUS at 07:02

## 2018-02-07 RX ADMIN — ACETAMINOPHEN 650 MG: 325 TABLET, FILM COATED ORAL at 05:02

## 2018-02-07 RX ADMIN — MAGNESIUM OXIDE TAB 400 MG (241.3 MG ELEMENTAL MG) 400 MG: 400 (241.3 MG) TAB at 09:02

## 2018-02-07 RX ADMIN — POTASSIUM CHLORIDE 60 MEQ: 1500 TABLET, EXTENDED RELEASE ORAL at 08:02

## 2018-02-07 RX ADMIN — INSULIN ASPART 3 UNITS: 100 INJECTION, SOLUTION INTRAVENOUS; SUBCUTANEOUS at 05:02

## 2018-02-07 RX ADMIN — ONDANSETRON 4 MG: 2 INJECTION INTRAMUSCULAR; INTRAVENOUS at 08:02

## 2018-02-07 RX ADMIN — ENOXAPARIN SODIUM 40 MG: 100 INJECTION SUBCUTANEOUS at 05:02

## 2018-02-07 RX ADMIN — IBUPROFEN 800 MG: 800 INJECTION INTRAVENOUS at 01:02

## 2018-02-07 RX ADMIN — IBUPROFEN 800 MG: 800 INJECTION INTRAVENOUS at 10:02

## 2018-02-07 RX ADMIN — MUPIROCIN 1 G: 20 OINTMENT TOPICAL at 10:02

## 2018-02-07 RX ADMIN — IBUPROFEN 800 MG: 800 INJECTION INTRAVENOUS at 05:02

## 2018-02-07 RX ADMIN — INSULIN ASPART 3 UNITS: 100 INJECTION, SOLUTION INTRAVENOUS; SUBCUTANEOUS at 11:02

## 2018-02-07 RX ADMIN — ACETAMINOPHEN 650 MG: 325 TABLET, FILM COATED ORAL at 11:02

## 2018-02-07 RX ADMIN — LISINOPRIL 30 MG: 20 TABLET ORAL at 08:02

## 2018-02-07 NOTE — PLAN OF CARE
Problem: Patient Care Overview  Goal: Plan of Care Review  Outcome: Ongoing (interventions implemented as appropriate)  Pt AAOx4. Pt tolerating SF clear liquid diet with no complaints of discomfort or nausea. Pain managed with morphine PCA. /88, 10mg hydralazine and scheduled coreg given, /57 and systolic remained <160 throughout night. Family at bedside. Pt ambulates to bathroom with standby assist. UOP adequate overnight. Call light in reach and bed in lowest position. SCDs on. Pt slept between care. Pt remains free of falls and injury. No acute events this shift. Will continue to monitor.

## 2018-02-07 NOTE — PLAN OF CARE
Problem: Patient Care Overview  Goal: Plan of Care Review  Outcome: Ongoing (interventions implemented as appropriate)  Plan of care discussed with pt. Pt verbalizes understanding. Pt tolerating clear liquid diet with no complaints of discomfort or nausea. Pain managed with PRN pain medication.  normal sinus rhythm. Pt ambulated in nguyen once with PT/OT. Pt positions independently. Vital signs stable. Pt remains free of falls and injury. No acute events this shift. Will continue to monitor.

## 2018-02-07 NOTE — PLAN OF CARE
PCP- DR. ABIMAEL ARORA     PT HAS A RIDE HOME AND FAMILY WITH NEARBY FAMILY MEMBERS       02/06/18 1917   Discharge Assessment   Assessment Type Discharge Planning Assessment   Confirmed/corrected address and phone number on facesheet? Yes   Expected Length of Stay (days) 3   Communicated expected length of stay with patient/caregiver yes   Prior to hospitilization cognitive status: Alert/Oriented   Prior to hospitalization functional status: Independent   Current cognitive status: Alert/Oriented   Current Functional Status: Independent   Lives With sibling(s);parent(s)   Able to Return to Prior Arrangements yes   Is patient able to care for self after discharge? Yes   Patient's perception of discharge disposition home or selfcare   Readmission Within The Last 30 Days no previous admission in last 30 days   Patient currently being followed by outpatient case management? No   Patient currently receives any other outside agency services? No   Equipment Currently Used at Home none   Do you have any problems affording any of your prescribed medications? No   Is the patient taking medications as prescribed? yes   Does the patient have transportation home? Yes   Transportation Available car;family or friend will provide   Does the patient receive services at the [facility]? No   Discharge Plan A Home with family   Discharge Plan B Home with family;Home Health   Patient/Family In Agreement With Plan yes

## 2018-02-07 NOTE — SUBJECTIVE & OBJECTIVE
Subjective:     Interval History:     No acute events overnight, patient still with no bowel function.     Post-Op Info:  Procedure(s) (LRB):  COLECTOMY-RIGHT, extended (N/A)  HYSTERECTOMY-ABDOMINAL-TOTAL (MARLENE) (Bilateral)  [id number] (BSO) (Bilateral)   2 Days Post-Op      Medications:  Continuous Infusions:   dextrose 5 % and 0.45 % NaCl 125 mL/hr at 02/07/18 0456     Scheduled Meds:   acetaminophen  650 mg Oral Q6H    carvedilol  6.25 mg Oral BID    enoxparin  40 mg Subcutaneous Q12H    ibuprofen  800 mg Intravenous Q8H    lisinopril  30 mg Oral Daily    mupirocin  1 g Nasal BID    potassium chloride  60 mEq Oral Once     PRN Meds:   dextrose 50%    diphenhydrAMINE    glucagon (human recombinant)    glucagon (human recombinant)    hydrALAZINE    insulin aspart    naloxone    ondansetron    oxyCODONE    oxyCODONE    promethazine (PHENERGAN) IVPB        Objective:     Vital Signs (Most Recent):  Temp: 97.9 °F (36.6 °C) (02/07/18 0741)  Pulse: 70 (02/07/18 0741)  Resp: 16 (02/07/18 0741)  BP: (!) 162/74 (02/07/18 0741)  SpO2: 96 % (02/07/18 0741) Vital Signs (24h Range):  Temp:  [97.9 °F (36.6 °C)-98.7 °F (37.1 °C)] 97.9 °F (36.6 °C)  Pulse:  [67-77] 70  Resp:  [16-20] 16  SpO2:  [95 %-100 %] 96 %  BP: (116-196)/(57-88) 162/74     Intake/Output - Last 3 Shifts       02/05 0700 - 02/06 0659 02/06 0700 - 02/07 0659 02/07 0700 - 02/08 0659    P.O. 390 1240     I.V. (mL/kg) 4342.5 (40.6) 3268.8 (30.5)     IV Piggyback 650 1000     Total Intake(mL/kg) 5382.5 (50.3) 5508.8 (51.5)     Urine (mL/kg/hr) 1470 (0.6) 1865 (0.7)     Emesis/NG output 150 (0.1) 0 (0)     Other 0 (0) 0 (0)     Stool 0 (0) 0 (0)     Blood 250 (0.1) 0 (0)     Total Output 1870 1865      Net +3512.5 +3643.8             Urine Occurrence 0 x 0 x     Stool Occurrence 0 x 0 x     Emesis Occurrence 0 x 0 x           Physical Exam     General: In no acute distress, well appearance.   CV: RRR, S1, S2 no murmur or  gallops   Pulm: non labored breathing, b/l clear breath sounds.   Abd: Obese, soft, mildly distended, appropriately tender to palpation around incision site. . Incision with glue c/d/i. Small area of possible seroma at the most superior aspect of the incision.   : No worthy in place.   Ext: wwp      Significant Labs:  BMP (Last 3 Results):   Recent Labs  Lab 02/06/18  0554 02/07/18  0617   * 207*    138   K 3.4* 3.2*   * 108   CO2 20* 23   BUN 4* 2*   CREATININE 0.7 0.8   CALCIUM 8.1* 8.4*   MG 1.6 1.8     CBC (Last 3 Results):   Recent Labs  Lab 02/06/18  0554 02/07/18  0617   WBC 9.96 8.82   RBC 4.30 3.86*   HGB 9.8* 8.9*   HCT 30.5* 27.2*    244   MCV 71* 71*   MCH 22.8* 23.1*   MCHC 32.1 32.7

## 2018-02-07 NOTE — PROGRESS NOTES
[facility]  Gynecologic Oncology  Progress Note      Patient Name: Raegan Trinidad  MRN: [id number]  Admission Date: 2/5/2018  Hospital Length of Stay: 2 days  Attending Provider: Miguel Leggett MD  Primary Care Provider: BEN Watson MD  Principal Problem: Cancer of right colon    Follow-up For: Procedure(s) (LRB):  COLECTOMY-RIGHT, extended (N/A)  HYSTERECTOMY-ABDOMINAL-TOTAL (MARLENE) (Bilateral)  [id number]-[id number]-[id number] (BSO) (Bilateral)  Post-Operative Day: 2 Days Post-Op  Subjective:      History of Present Illness:  Patient is a 52yo with h/o PMB and an adnexal mass who presents for MARLENE/BSO in conjunction with CRS bowel resection due to colon cancer.      She underwent a screening colonoscopy in November 2017  and was found to ave joel intra-luminal mass in the right hepatic flexure.  A biopsy was consistent with primary adenocarcinoma.  The patient had a PET scan done in Nov 2017  that showed increased FDG uptake in the hepatic flexure with that SUV of 7.8.The pelvic mass was PET negative.      A CT scan of the abdomen and pelvis in Nov 2017 showed a 7 cm right adnexal mass.  Endometrial stripe of 16 mm.    Hospital Course:  POD #1-2: Patient doing well postoperatively. No nausea or vomiting. Continue management per CRS.     Interval History: POD #2 s/p R colectomy/MARLENE/BSO. Patient doing well postoperatively. Pain is well controlled, and she is tolerating clear liquids without nausea and vomiting.     Scheduled Meds:   carvedilol  6.25 mg Oral BID    enoxparin  40 mg Subcutaneous Q12H    ibuprofen  800 mg Intravenous Q8H    lisinopril  30 mg Oral Daily    mupirocin  1 g Nasal BID     Continuous Infusions:   dextrose 5 % and 0.45 % NaCl 125 mL/hr at 02/07/18 0456    morphine       PRN Meds:dextrose 50%, diphenhydrAMINE, glucagon (human recombinant), glucagon (human recombinant), hydrALAZINE, insulin aspart, naloxone, ondansetron, promethazine (PHENERGAN) IVPB    Review of  patient's allergies indicates:  No Known Allergies    Objective:     Vital Signs (Most Recent):  Temp: 98.7 °F (37.1 °C) (02/07/18 0455)  Pulse: 77 (02/07/18 0455)  Resp: 20 (02/07/18 0455)  BP: (!) 151/66 (02/07/18 0455)  SpO2: 99 % (02/07/18 0455) Vital Signs (24h Range):  Temp:  [98 °F (36.7 °C)-98.7 °F (37.1 °C)] 98.7 °F (37.1 °C)  Pulse:  [67-77] 77  Resp:  [18-20] 20  SpO2:  [95 %-100 %] 99 %  BP: (116-196)/(57-88) 151/66     Weight: 107 kg (235 lb 14.3 oz)  Body mass index is 39.87 kg/m².    Intake/Output - Last 3 Shifts       02/05 0700 - 02/06 0659 02/06 0700 - 02/07 0659    P.O. 390 1090    I.V. (mL/kg) 4342.5 (40.6) 3079.2 (28.8)    IV Piggyback 650 750    Total Intake(mL/kg) 5382.5 (50.3) 4919.2 (46)    Urine (mL/kg/hr) 1470 (0.6) 1315 (0.5)    Emesis/NG output 150 (0.1)     Other 0 (0)     Stool 0 (0) 0 (0)    Blood 250 (0.1)     Total Output 1870 1315    Net +3512.5 +3604.2          Urine Occurrence 0 x     Stool Occurrence 0 x 0 x    Emesis Occurrence 0 x              Physical Exam:   Constitutional: She is oriented to person, place, and time. She appears well-developed and well-nourished. No distress.    HENT:   Head: Atraumatic.    Eyes: EOM are normal. Pupils are equal, round, and reactive to light.    Neck: Normal range of motion. Neck supple.    Cardiovascular: Normal rate and regular rhythm.     Pulmonary/Chest: Effort normal. No respiratory distress. She has no wheezes.        Abdominal: Soft. She exhibits abdominal incision (c/d/i). She exhibits no distension. There is no tenderness.             Musculoskeletal: Normal range of motion. She exhibits no edema or tenderness.       Neurological: She is alert and oriented to person, place, and time. She has normal reflexes.    Skin: Skin is warm and dry. She is not diaphoretic.        Lines/Drains/Airways     Peripheral Intravenous Line                 Peripheral IV - Single Lumen 02/05/18 0703 Left Forearm 1 day         Peripheral IV - Single  Lumen 02/05/18 0733 Right Hand 1 day                Laboratory:  Recent Lab Results       02/06/18  2320 02/06/18  1712 02/06/18  1201 02/06/18  0622      POCT Glucose 226(H) 219(H) 209(H) 181(H)               Assessment/Plan:     * Cancer of right colon    - S/p hemicolectomy  - Postoperative management including diet per CRS        Ovarian mass, right    - Doing well postoperatively  - On clear liquid diet  - Continue current management per CRS          VTE Risk Mitigation         Ordered     enoxaparin injection 40 mg  Every 12 hours (non-standard times)     Route:  Subcutaneous        02/06/18 1820     Medium Risk of VTE  Once      02/05/18 1039          Was worthy catheter removed? Yes    Donta Campos MD  Gynecologic Oncology  [facility]

## 2018-02-07 NOTE — ASSESSMENT & PLAN NOTE
52 yo F 2 Days Post-Op extended right hemicolectomy and MARLENE/BSO by gyn onc still with no bowel function.     - Continue with Clear Liquid diet.   - Stop PCA morphine, start PO oxycodone.   - OOB ambulate, PT/OT  - Daily labs  - DVT/GI ppx   - Voiding and ambulating.     Aleshia Perez MD  General Surgery PGY IV  Beeper: [phone]

## 2018-02-07 NOTE — SUBJECTIVE & OBJECTIVE
Interval History: POD #2 s/p R colectomy/MARLENE/BSO. Patient doing well postoperatively. Pain is well controlled, and she is tolerating clear liquids without nausea and vomiting.     Scheduled Meds:   carvedilol  6.25 mg Oral BID    enoxparin  40 mg Subcutaneous Q12H    ibuprofen  800 mg Intravenous Q8H    lisinopril  30 mg Oral Daily    mupirocin  1 g Nasal BID     Continuous Infusions:   dextrose 5 % and 0.45 % NaCl 125 mL/hr at 02/07/18 0456    morphine       PRN Meds:dextrose 50%, diphenhydrAMINE, glucagon (human recombinant), glucagon (human recombinant), hydrALAZINE, insulin aspart, naloxone, ondansetron, promethazine (PHENERGAN) IVPB    Review of patient's allergies indicates:  No Known Allergies    Objective:     Vital Signs (Most Recent):  Temp: 98.7 °F (37.1 °C) (02/07/18 0455)  Pulse: 77 (02/07/18 0455)  Resp: 20 (02/07/18 0455)  BP: (!) 151/66 (02/07/18 0455)  SpO2: 99 % (02/07/18 0455) Vital Signs (24h Range):  Temp:  [98 °F (36.7 °C)-98.7 °F (37.1 °C)] 98.7 °F (37.1 °C)  Pulse:  [67-77] 77  Resp:  [18-20] 20  SpO2:  [95 %-100 %] 99 %  BP: (116-196)/(57-88) 151/66     Weight: 107 kg (235 lb 14.3 oz)  Body mass index is 39.87 kg/m².    Intake/Output - Last 3 Shifts       02/05 0700 - 02/06 0659 02/06 0700 - 02/07 0659    P.O. 390 1090    I.V. (mL/kg) 4342.5 (40.6) 3079.2 (28.8)    IV Piggyback 650 750    Total Intake(mL/kg) 5382.5 (50.3) 4919.2 (46)    Urine (mL/kg/hr) 1470 (0.6) 1315 (0.5)    Emesis/NG output 150 (0.1)     Other 0 (0)     Stool 0 (0) 0 (0)    Blood 250 (0.1)     Total Output 1870 1315    Net +3512.5 +3604.2          Urine Occurrence 0 x     Stool Occurrence 0 x 0 x    Emesis Occurrence 0 x              Physical Exam:   Constitutional: She is oriented to person, place, and time. She appears well-developed and well-nourished. No distress.    HENT:   Head: Atraumatic.    Eyes: EOM are normal. Pupils are equal, round, and reactive to light.    Neck: Normal range of motion. Neck supple.     Cardiovascular: Normal rate and regular rhythm.     Pulmonary/Chest: Effort normal. No respiratory distress. She has no wheezes.        Abdominal: Soft. She exhibits abdominal incision (c/d/i). She exhibits no distension. There is no tenderness.             Musculoskeletal: Normal range of motion. She exhibits no edema or tenderness.       Neurological: She is alert and oriented to person, place, and time. She has normal reflexes.    Skin: Skin is warm and dry. She is not diaphoretic.        Lines/Drains/Airways     Peripheral Intravenous Line                 Peripheral IV - Single Lumen 02/05/18 0703 Left Forearm 1 day         Peripheral IV - Single Lumen 02/05/18 0733 Right Hand 1 day                Laboratory:  Recent Lab Results       02/06/18  2320 02/06/18  1712 02/06/18  1201 02/06/18  0622      POCT Glucose 226(H) 219(H) 209(H) 181(H)

## 2018-02-07 NOTE — PROGRESS NOTES
[facility]  Colorectal Surgery  Progress Note    Patient Name: Raegan Trinidad  MRN: [id number]  Admission Date: 2/5/2018  Hospital Length of Stay: 2 days  Attending Physician: Miguel Leggett MD    Subjective:     Interval History:     No acute events overnight, patient still with no bowel function.     Post-Op Info:  Procedure(s) (LRB):  COLECTOMY-RIGHT, extended (N/A)  HYSTERECTOMY-ABDOMINAL-TOTAL (MARLENE) (Bilateral)  [id number] (BSO) (Bilateral)   2 Days Post-Op      Medications:  Continuous Infusions:   dextrose 5 % and 0.45 % NaCl 125 mL/hr at 02/07/18 0456     Scheduled Meds:   acetaminophen  650 mg Oral Q6H    carvedilol  6.25 mg Oral BID    enoxparin  40 mg Subcutaneous Q12H    ibuprofen  800 mg Intravenous Q8H    lisinopril  30 mg Oral Daily    mupirocin  1 g Nasal BID    potassium chloride  60 mEq Oral Once     PRN Meds:   dextrose 50%    diphenhydrAMINE    glucagon (human recombinant)    glucagon (human recombinant)    hydrALAZINE    insulin aspart    naloxone    ondansetron    oxyCODONE    oxyCODONE    promethazine (PHENERGAN) IVPB        Objective:     Vital Signs (Most Recent):  Temp: 97.9 °F (36.6 °C) (02/07/18 0741)  Pulse: 70 (02/07/18 0741)  Resp: 16 (02/07/18 0741)  BP: (!) 162/74 (02/07/18 0741)  SpO2: 96 % (02/07/18 0741) Vital Signs (24h Range):  Temp:  [97.9 °F (36.6 °C)-98.7 °F (37.1 °C)] 97.9 °F (36.6 °C)  Pulse:  [67-77] 70  Resp:  [16-20] 16  SpO2:  [95 %-100 %] 96 %  BP: (116-196)/(57-88) 162/74     Intake/Output - Last 3 Shifts       02/05 0700 - 02/06 0659 02/06 0700 - 02/07 0659 02/07 0700 - 02/08 0659    P.O. 390 1240     I.V. (mL/kg) 4342.5 (40.6) 3268.8 (30.5)     IV Piggyback 650 1000     Total Intake(mL/kg) 5382.5 (50.3) 5508.8 (51.5)     Urine (mL/kg/hr) 1470 (0.6) 1865 (0.7)     Emesis/NG output 150 (0.1) 0 (0)     Other 0 (0) 0 (0)     Stool 0 (0) 0 (0)     Blood 250 (0.1) 0 (0)     Total Output 1870 1865      Net +3512.5  +3643.8             Urine Occurrence 0 x 0 x     Stool Occurrence 0 x 0 x     Emesis Occurrence 0 x 0 x           Physical Exam     General: In no acute distress, well appearance.   CV: RRR, S1, S2 no murmur or gallops   Pulm: non labored breathing, b/l clear breath sounds.   Abd: Obese, soft, mildly distended, appropriately tender to palpation around incision site. . Incision with glue c/d/i. Small area of possible seroma at the most superior aspect of the incision.   : No worthy in place.   Ext: wwp      Significant Labs:  BMP (Last 3 Results):   Recent Labs  Lab 02/06/18  0554 02/07/18  0617   * 207*    138   K 3.4* 3.2*   * 108   CO2 20* 23   BUN 4* 2*   CREATININE 0.7 0.8   CALCIUM 8.1* 8.4*   MG 1.6 1.8     CBC (Last 3 Results):   Recent Labs  Lab 02/06/18  0554 02/07/18  0617   WBC 9.96 8.82   RBC 4.30 3.86*   HGB 9.8* 8.9*   HCT 30.5* 27.2*    244   MCV 71* 71*   MCH 22.8* 23.1*   MCHC 32.1 32.7           Assessment/Plan:     * Cancer of right colon    54 yo F 2 Days Post-Op extended right hemicolectomy and MARLENE/BSO by gyn onc still with no bowel function.     - Continue with Clear Liquid diet.   - Stop PCA morphine, start PO oxycodone.   - OOB ambulate, PT/OT  - Daily labs  - DVT/GI ppx   - Voiding and ambulating.     Aleshia Perez MD  General Surgery PGY IV  Beeper: [phone]            Type 2 diabetes mellitus without complication, without long-term current use of insulin    On ISSS    Increased the dose of the sliding scale.         Essential hypertension    On home HTN medicine        Ovarian mass, right    S/p MARLENE BSO

## 2018-02-08 LAB
ANION GAP SERPL CALC-SCNC: 7 MMOL/L
BASOPHILS # BLD AUTO: 0.01 K/UL
BASOPHILS NFR BLD: 0.1 %
BUN SERPL-MCNC: 3 MG/DL
CALCIUM SERPL-MCNC: 8.8 MG/DL
CHLORIDE SERPL-SCNC: 109 MMOL/L
CO2 SERPL-SCNC: 22 MMOL/L
CREAT SERPL-MCNC: 0.7 MG/DL
DIFFERENTIAL METHOD: ABNORMAL
EOSINOPHIL # BLD AUTO: 0.1 K/UL
EOSINOPHIL NFR BLD: 1.6 %
ERYTHROCYTE [DISTWIDTH] IN BLOOD BY AUTOMATED COUNT: 22.3 %
EST. GFR  (AFRICAN AMERICAN): >60 ML/MIN/1.73 M^2
EST. GFR  (NON AFRICAN AMERICAN): >60 ML/MIN/1.73 M^2
GLUCOSE SERPL-MCNC: 137 MG/DL
HCT VFR BLD AUTO: 30.5 %
HGB BLD-MCNC: 9.9 G/DL
IMM GRANULOCYTES # BLD AUTO: 0.03 K/UL
IMM GRANULOCYTES NFR BLD AUTO: 0.4 %
LYMPHOCYTES # BLD AUTO: 1.9 K/UL
LYMPHOCYTES NFR BLD: 25.9 %
MAGNESIUM SERPL-MCNC: 1.8 MG/DL
MCH RBC QN AUTO: 23.3 PG
MCHC RBC AUTO-ENTMCNC: 32.5 G/DL
MCV RBC AUTO: 72 FL
MONOCYTES # BLD AUTO: 0.8 K/UL
MONOCYTES NFR BLD: 10.4 %
NEUTROPHILS # BLD AUTO: 4.6 K/UL
NEUTROPHILS NFR BLD: 61.6 %
NRBC BLD-RTO: 0 /100 WBC
PHOSPHATE SERPL-MCNC: 2.5 MG/DL
PLATELET # BLD AUTO: 279 K/UL
PMV BLD AUTO: 11.4 FL
POCT GLUCOSE: 148 MG/DL (ref 70–110)
POCT GLUCOSE: 168 MG/DL (ref 70–110)
POCT GLUCOSE: 181 MG/DL (ref 70–110)
POCT GLUCOSE: 186 MG/DL (ref 70–110)
POTASSIUM SERPL-SCNC: 3.7 MMOL/L
RBC # BLD AUTO: 4.25 M/UL
SODIUM SERPL-SCNC: 138 MMOL/L
WBC # BLD AUTO: 7.41 K/UL

## 2018-02-08 PROCEDURE — 80048 BASIC METABOLIC PNL TOTAL CA: CPT

## 2018-02-08 PROCEDURE — 84100 ASSAY OF PHOSPHORUS: CPT

## 2018-02-08 PROCEDURE — 83735 ASSAY OF MAGNESIUM: CPT

## 2018-02-08 PROCEDURE — 85025 COMPLETE CBC W/AUTO DIFF WBC: CPT

## 2018-02-08 PROCEDURE — 63600175 PHARM REV CODE 636 W HCPCS: Performed by: STUDENT IN AN ORGANIZED HEALTH CARE EDUCATION/TRAINING PROGRAM

## 2018-02-08 PROCEDURE — 25000003 PHARM REV CODE 250: Performed by: STUDENT IN AN ORGANIZED HEALTH CARE EDUCATION/TRAINING PROGRAM

## 2018-02-08 PROCEDURE — 20600001 HC STEP DOWN PRIVATE ROOM

## 2018-02-08 PROCEDURE — 36415 COLL VENOUS BLD VENIPUNCTURE: CPT

## 2018-02-08 RX ORDER — MORPHINE SULFATE 15 MG/1
30 TABLET ORAL EVERY 4 HOURS PRN
Status: DISCONTINUED | OUTPATIENT
Start: 2018-02-08 | End: 2018-02-08

## 2018-02-08 RX ORDER — POLYETHYLENE GLYCOL 3350 17 G/17G
17 POWDER, FOR SOLUTION ORAL DAILY
Status: DISCONTINUED | OUTPATIENT
Start: 2018-02-08 | End: 2018-02-09 | Stop reason: HOSPADM

## 2018-02-08 RX ORDER — IBUPROFEN 400 MG/1
800 TABLET ORAL EVERY 8 HOURS
Status: DISCONTINUED | OUTPATIENT
Start: 2018-02-08 | End: 2018-02-09 | Stop reason: HOSPADM

## 2018-02-08 RX ADMIN — INSULIN ASPART 3 UNITS: 100 INJECTION, SOLUTION INTRAVENOUS; SUBCUTANEOUS at 07:02

## 2018-02-08 RX ADMIN — ACETAMINOPHEN 650 MG: 325 TABLET, FILM COATED ORAL at 05:02

## 2018-02-08 RX ADMIN — CARVEDILOL 6.25 MG: 6.25 TABLET, FILM COATED ORAL at 09:02

## 2018-02-08 RX ADMIN — IBUPROFEN 800 MG: 400 TABLET, FILM COATED ORAL at 09:02

## 2018-02-08 RX ADMIN — MUPIROCIN 1 G: 20 OINTMENT TOPICAL at 08:02

## 2018-02-08 RX ADMIN — HYDRALAZINE HYDROCHLORIDE 10 MG: 20 INJECTION INTRAMUSCULAR; INTRAVENOUS at 09:02

## 2018-02-08 RX ADMIN — LISINOPRIL 30 MG: 20 TABLET ORAL at 08:02

## 2018-02-08 RX ADMIN — INSULIN ASPART 3 UNITS: 100 INJECTION, SOLUTION INTRAVENOUS; SUBCUTANEOUS at 11:02

## 2018-02-08 RX ADMIN — IBUPROFEN 800 MG: 800 INJECTION INTRAVENOUS at 01:02

## 2018-02-08 RX ADMIN — INSULIN ASPART 1 UNITS: 100 INJECTION, SOLUTION INTRAVENOUS; SUBCUTANEOUS at 10:02

## 2018-02-08 RX ADMIN — CARVEDILOL 6.25 MG: 6.25 TABLET, FILM COATED ORAL at 08:02

## 2018-02-08 RX ADMIN — ACETAMINOPHEN 650 MG: 325 TABLET, FILM COATED ORAL at 11:02

## 2018-02-08 RX ADMIN — HYDRALAZINE HYDROCHLORIDE 10 MG: 20 INJECTION INTRAMUSCULAR; INTRAVENOUS at 05:02

## 2018-02-08 RX ADMIN — IBUPROFEN 800 MG: 800 INJECTION INTRAVENOUS at 06:02

## 2018-02-08 RX ADMIN — MUPIROCIN 1 G: 20 OINTMENT TOPICAL at 09:02

## 2018-02-08 RX ADMIN — ENOXAPARIN SODIUM 40 MG: 100 INJECTION SUBCUTANEOUS at 06:02

## 2018-02-08 RX ADMIN — ENOXAPARIN SODIUM 40 MG: 100 INJECTION SUBCUTANEOUS at 05:02

## 2018-02-08 RX ADMIN — POLYETHYLENE GLYCOL 3350 17 G: 17 POWDER, FOR SOLUTION ORAL at 06:02

## 2018-02-08 NOTE — PLAN OF CARE
Problem: Patient Care Overview  Goal: Plan of Care Review  Outcome: Ongoing (interventions implemented as appropriate)  No acute events overnight,. Reports passing gas at 0330. Denied pain, controlled with scheduled tylenol. Tolerating clear liquids with no nausea reported. Hypertensive overnight requiring 1 dose PRN hydralazine for SBP >170. All other vitals stable on room air. Blood sugar checked before bed with no SSI coverage needed. IVF infused. Abdominal binder in place, incision clean and intact. Urinating adequate amounts in toilet, up ad oliver. Repositions in bed independently. Understands and agrees with plan of care. Slept well between care. Instructed to call for assistance as needed, call light in reach. Frequent rounds made for patient's safety. See flowsheets for detailed assessment. Continuing to monitor.

## 2018-02-08 NOTE — SUBJECTIVE & OBJECTIVE
Subjective:     Interval History:     No acute events overnight, patient passing flatus this AM. Pain well controlled, ambulating.    Post-Op Info:  Procedure(s) (LRB):  COLECTOMY-RIGHT, extended (N/A)  HYSTERECTOMY-ABDOMINAL-TOTAL (MARLENE) (Bilateral)  [id number] (BSO) (Bilateral)   3 Days Post-Op      Medications:  Continuous Infusions:    Scheduled Meds:   acetaminophen  650 mg Oral Q6H    carvedilol  6.25 mg Oral BID    enoxparin  40 mg Subcutaneous Q12H    ibuprofen  800 mg Intravenous Q8H    lisinopril  30 mg Oral Daily    mupirocin  1 g Nasal BID     PRN Meds:   dextrose 50%    diphenhydrAMINE    glucagon (human recombinant)    glucagon (human recombinant)    hydrALAZINE    insulin aspart    naloxone    ondansetron    oxyCODONE    oxyCODONE    promethazine (PHENERGAN) IVPB        Objective:     Vital Signs (Most Recent):  Temp: 97.6 °F (36.4 °C) (02/08/18 0800)  Pulse: 84 (02/08/18 0800)  Resp: 18 (02/08/18 0800)  BP: (!) 153/70 (02/08/18 0900)  SpO2: 98 % (02/08/18 0800) Vital Signs (24h Range):  Temp:  [97.6 °F (36.4 °C)-98.8 °F (37.1 °C)] 97.6 °F (36.4 °C)  Pulse:  [63-84] 84  Resp:  [16-18] 18  SpO2:  [96 %-100 %] 98 %  BP: (135-174)/(63-78) 153/70     Intake/Output - Last 3 Shifts       02/06 0700 - 02/07 0659 02/07 0700 - 02/08 0659 02/08 0700 - 02/09 0659    P.O. 1240 995     I.V. (mL/kg) 3268.8 (30.5) 1808.8 (16.9)     IV Piggyback 1000 500     Total Intake(mL/kg) 5508.8 (51.5) 3303.8 (30.9)     Urine (mL/kg/hr) 1865 (0.7) 2520 (1)     Emesis/NG output 0 (0)      Other 0 (0)      Stool 0 (0)      Blood 0 (0)      Total Output 1865 2520      Net +3643.8 +783.8             Urine Occurrence 0 x      Stool Occurrence 0 x      Emesis Occurrence 0 x            Physical Exam       General: In no acute distress, well appearance.   CV: RRR, S1, S2 no murmur or gallops   Pulm: non labored breathing, b/l clear breath sounds.   Abd: Obese, soft, mildly distended, appropriately  tender to palpation around incision site. . Incision with glue c/d/i. Small area of possible seroma at the most superior aspect of the incision less painful today.   : No worthy in place.   Ext: wwp      Significant Labs:  BMP (Last 3 Results):     Recent Labs  Lab 02/06/18  0554 02/07/18  0617 02/08/18  0404   * 207* 137*    138 138   K 3.4* 3.2* 3.7   * 108 109   CO2 20* 23 22*   BUN 4* 2* 3*   CREATININE 0.7 0.8 0.7   CALCIUM 8.1* 8.4* 8.8   MG 1.6 1.8 1.8     CBC (Last 3 Results):     Recent Labs  Lab 02/06/18  0554 02/07/18  0617 02/08/18  0404   WBC 9.96 8.82 7.41   RBC 4.30 3.86* 4.25   HGB 9.8* 8.9* 9.9*   HCT 30.5* 27.2* 30.5*    244 279   MCV 71* 71* 72*   MCH 22.8* 23.1* 23.3*   MCHC 32.1 32.7 32.5

## 2018-02-08 NOTE — PLAN OF CARE
Problem: Patient Care Overview  Goal: Plan of Care Review  Outcome: Ongoing (interventions implemented as appropriate)  Plan of care discussed with pt. Pt verbalizes understanding. Pt tolerating reg low fiber low residue with no complaints of discomfort or nausea. Pain managed with PRN pain medication. Pt normal sinus rhythm. Pt ambulated in nguyen  with PT/OT. Pt positions independently. Vital signs stable. Pt remains free of falls and injury. No acute events this shift. Will continue to monitor.

## 2018-02-08 NOTE — ASSESSMENT & PLAN NOTE
52 yo F 3 Days Post-Op extended right hemicolectomy and MARLENE/BSO by gyn onc still with no bowel function.     - Advance to low res diet today  - PO oxycodone.   - OOB ambulate, PT/OT  - Daily labs  - DVT/GI ppx   - Voiding and ambulating.

## 2018-02-08 NOTE — PROGRESS NOTES
[facility]  Colorectal Surgery  Progress Note    Patient Name: Raegan Trinidad  MRN: [id number]  Admission Date: 2/5/2018  Hospital Length of Stay: 3 days  Attending Physician: Miguel Leggett MD    Subjective:     Interval History:     No acute events overnight, patient passing flatus this AM. Pain well controlled, ambulating.    Post-Op Info:  Procedure(s) (LRB):  COLECTOMY-RIGHT, extended (N/A)  HYSTERECTOMY-ABDOMINAL-TOTAL (MARLENE) (Bilateral)  [id number] (BSO) (Bilateral)   3 Days Post-Op      Medications:  Continuous Infusions:    Scheduled Meds:   acetaminophen  650 mg Oral Q6H    carvedilol  6.25 mg Oral BID    enoxparin  40 mg Subcutaneous Q12H    ibuprofen  800 mg Intravenous Q8H    lisinopril  30 mg Oral Daily    mupirocin  1 g Nasal BID     PRN Meds:   dextrose 50%    diphenhydrAMINE    glucagon (human recombinant)    glucagon (human recombinant)    hydrALAZINE    insulin aspart    naloxone    ondansetron    oxyCODONE    oxyCODONE    promethazine (PHENERGAN) IVPB        Objective:     Vital Signs (Most Recent):  Temp: 97.6 °F (36.4 °C) (02/08/18 0800)  Pulse: 84 (02/08/18 0800)  Resp: 18 (02/08/18 0800)  BP: (!) 153/70 (02/08/18 0900)  SpO2: 98 % (02/08/18 0800) Vital Signs (24h Range):  Temp:  [97.6 °F (36.4 °C)-98.8 °F (37.1 °C)] 97.6 °F (36.4 °C)  Pulse:  [63-84] 84  Resp:  [16-18] 18  SpO2:  [96 %-100 %] 98 %  BP: (135-174)/(63-78) 153/70     Intake/Output - Last 3 Shifts       02/06 0700 - 02/07 0659 02/07 0700 - 02/08 0659 02/08 0700 - 02/09 0659    P.O. 1240 995     I.V. (mL/kg) 3268.8 (30.5) 1808.8 (16.9)     IV Piggyback 1000 500     Total Intake(mL/kg) 5508.8 (51.5) 3303.8 (30.9)     Urine (mL/kg/hr) 1865 (0.7) 2520 (1)     Emesis/NG output 0 (0)      Other 0 (0)      Stool 0 (0)      Blood 0 (0)      Total Output 1865 2520      Net +3643.8 +783.8             Urine Occurrence 0 x      Stool Occurrence 0 x      Emesis Occurrence 0 x             Physical Exam       General: In no acute distress, well appearance.   CV: RRR, S1, S2 no murmur or gallops   Pulm: non labored breathing, b/l clear breath sounds.   Abd: Obese, soft, mildly distended, appropriately tender to palpation around incision site. . Incision with glue c/d/i. Small area of possible seroma at the most superior aspect of the incision less painful today.   : No worthy in place.   Ext: wwp      Significant Labs:  BMP (Last 3 Results):     Recent Labs  Lab 02/06/18  0554 02/07/18  0617 02/08/18  0404   * 207* 137*    138 138   K 3.4* 3.2* 3.7   * 108 109   CO2 20* 23 22*   BUN 4* 2* 3*   CREATININE 0.7 0.8 0.7   CALCIUM 8.1* 8.4* 8.8   MG 1.6 1.8 1.8     CBC (Last 3 Results):     Recent Labs  Lab 02/06/18  0554 02/07/18  0617 02/08/18  0404   WBC 9.96 8.82 7.41   RBC 4.30 3.86* 4.25   HGB 9.8* 8.9* 9.9*   HCT 30.5* 27.2* 30.5*    244 279   MCV 71* 71* 72*   MCH 22.8* 23.1* 23.3*   MCHC 32.1 32.7 32.5           Assessment/Plan:     * Cancer of right colon    52 yo F 3 Days Post-Op extended right hemicolectomy and MARLENE/BSO by gyn onc still with no bowel function.     - Advance to low res diet today  - PO oxycodone.   - OOB ambulate, PT/OT  - Daily labs  - DVT/GI ppx   - Voiding and ambulating.               Type 2 diabetes mellitus without complication, without long-term current use of insulin    On ISSS    Increased the dose of the sliding scale.         Essential hypertension    On home HTN medicine        Ovarian mass, right    S/p MARLENE BSO              Misha Juarez MD  Colorectal Surgery  [facility]

## 2018-02-09 ENCOUNTER — TELEPHONE (OUTPATIENT)
Dept: SURGERY | Facility: CLINIC | Age: 53
End: 2018-02-09

## 2018-02-09 VITALS
HEART RATE: 69 BPM | HEIGHT: 65 IN | WEIGHT: 235.88 LBS | OXYGEN SATURATION: 100 % | TEMPERATURE: 97 F | BODY MASS INDEX: 39.3 KG/M2 | SYSTOLIC BLOOD PRESSURE: 144 MMHG | DIASTOLIC BLOOD PRESSURE: 66 MMHG | RESPIRATION RATE: 18 BRPM

## 2018-02-09 LAB
ANION GAP SERPL CALC-SCNC: 10 MMOL/L
BASOPHILS # BLD AUTO: 0.01 K/UL
BASOPHILS NFR BLD: 0.1 %
BUN SERPL-MCNC: 5 MG/DL
CALCIUM SERPL-MCNC: 8.7 MG/DL
CHLORIDE SERPL-SCNC: 110 MMOL/L
CO2 SERPL-SCNC: 19 MMOL/L
CREAT SERPL-MCNC: 0.7 MG/DL
DIFFERENTIAL METHOD: ABNORMAL
EOSINOPHIL # BLD AUTO: 0.2 K/UL
EOSINOPHIL NFR BLD: 2.2 %
ERYTHROCYTE [DISTWIDTH] IN BLOOD BY AUTOMATED COUNT: 22.2 %
EST. GFR  (AFRICAN AMERICAN): >60 ML/MIN/1.73 M^2
EST. GFR  (NON AFRICAN AMERICAN): >60 ML/MIN/1.73 M^2
GLUCOSE SERPL-MCNC: 139 MG/DL
HCT VFR BLD AUTO: 28 %
HGB BLD-MCNC: 9.3 G/DL
IMM GRANULOCYTES # BLD AUTO: 0.04 K/UL
IMM GRANULOCYTES NFR BLD AUTO: 0.5 %
LYMPHOCYTES # BLD AUTO: 1.3 K/UL
LYMPHOCYTES NFR BLD: 17.7 %
MAGNESIUM SERPL-MCNC: 1.7 MG/DL
MCH RBC QN AUTO: 23.4 PG
MCHC RBC AUTO-ENTMCNC: 33.2 G/DL
MCV RBC AUTO: 70 FL
MONOCYTES # BLD AUTO: 0.7 K/UL
MONOCYTES NFR BLD: 9.3 %
NEUTROPHILS # BLD AUTO: 5.1 K/UL
NEUTROPHILS NFR BLD: 70.2 %
NRBC BLD-RTO: 0 /100 WBC
PHOSPHATE SERPL-MCNC: 3.2 MG/DL
PLATELET # BLD AUTO: 270 K/UL
PMV BLD AUTO: 11.4 FL
POCT GLUCOSE: 129 MG/DL (ref 70–110)
POCT GLUCOSE: 190 MG/DL (ref 70–110)
POCT GLUCOSE: 228 MG/DL (ref 70–110)
POTASSIUM SERPL-SCNC: 3.6 MMOL/L
RBC # BLD AUTO: 3.98 M/UL
SODIUM SERPL-SCNC: 139 MMOL/L
WBC # BLD AUTO: 7.3 K/UL

## 2018-02-09 PROCEDURE — 36415 COLL VENOUS BLD VENIPUNCTURE: CPT

## 2018-02-09 PROCEDURE — 25000003 PHARM REV CODE 250: Performed by: STUDENT IN AN ORGANIZED HEALTH CARE EDUCATION/TRAINING PROGRAM

## 2018-02-09 PROCEDURE — 80048 BASIC METABOLIC PNL TOTAL CA: CPT

## 2018-02-09 PROCEDURE — 63600175 PHARM REV CODE 636 W HCPCS: Performed by: STUDENT IN AN ORGANIZED HEALTH CARE EDUCATION/TRAINING PROGRAM

## 2018-02-09 PROCEDURE — 84100 ASSAY OF PHOSPHORUS: CPT

## 2018-02-09 PROCEDURE — 83735 ASSAY OF MAGNESIUM: CPT

## 2018-02-09 PROCEDURE — 85025 COMPLETE CBC W/AUTO DIFF WBC: CPT

## 2018-02-09 RX ORDER — BISACODYL 10 MG
10 SUPPOSITORY, RECTAL RECTAL DAILY PRN
Status: DISCONTINUED | OUTPATIENT
Start: 2018-02-09 | End: 2018-02-09 | Stop reason: HOSPADM

## 2018-02-09 RX ORDER — LANOLIN ALCOHOL/MO/W.PET/CERES
400 CREAM (GRAM) TOPICAL ONCE
Status: COMPLETED | OUTPATIENT
Start: 2018-02-09 | End: 2018-02-09

## 2018-02-09 RX ORDER — POLYETHYLENE GLYCOL 3350 17 G/17G
17 POWDER, FOR SOLUTION ORAL DAILY
Qty: 100 PACKET | Refills: 0 | Status: SHIPPED | OUTPATIENT
Start: 2018-02-10

## 2018-02-09 RX ORDER — OXYCODONE HYDROCHLORIDE 5 MG/1
5 TABLET ORAL EVERY 4 HOURS PRN
Qty: 33 TABLET | Refills: 0 | Status: SHIPPED | OUTPATIENT
Start: 2018-02-09 | End: 2018-02-22 | Stop reason: SDUPTHER

## 2018-02-09 RX ORDER — POTASSIUM CHLORIDE 750 MG/1
10 CAPSULE, EXTENDED RELEASE ORAL ONCE
Status: COMPLETED | OUTPATIENT
Start: 2018-02-09 | End: 2018-02-09

## 2018-02-09 RX ADMIN — ACETAMINOPHEN 650 MG: 325 TABLET, FILM COATED ORAL at 11:02

## 2018-02-09 RX ADMIN — ACETAMINOPHEN 650 MG: 325 TABLET, FILM COATED ORAL at 06:02

## 2018-02-09 RX ADMIN — MAGNESIUM OXIDE TAB 400 MG (241.3 MG ELEMENTAL MG) 400 MG: 400 (241.3 MG) TAB at 11:02

## 2018-02-09 RX ADMIN — CARVEDILOL 6.25 MG: 6.25 TABLET, FILM COATED ORAL at 08:02

## 2018-02-09 RX ADMIN — POLYETHYLENE GLYCOL 3350 17 G: 17 POWDER, FOR SOLUTION ORAL at 12:02

## 2018-02-09 RX ADMIN — ENOXAPARIN SODIUM 40 MG: 100 INJECTION SUBCUTANEOUS at 06:02

## 2018-02-09 RX ADMIN — IBUPROFEN 800 MG: 400 TABLET, FILM COATED ORAL at 02:02

## 2018-02-09 RX ADMIN — OXYCODONE HYDROCHLORIDE 5 MG: 5 TABLET ORAL at 06:02

## 2018-02-09 RX ADMIN — HYDRALAZINE HYDROCHLORIDE 10 MG: 20 INJECTION INTRAMUSCULAR; INTRAVENOUS at 07:02

## 2018-02-09 RX ADMIN — MUPIROCIN 1 G: 20 OINTMENT TOPICAL at 08:02

## 2018-02-09 RX ADMIN — IBUPROFEN 800 MG: 400 TABLET, FILM COATED ORAL at 06:02

## 2018-02-09 RX ADMIN — POTASSIUM CHLORIDE 10 MEQ: 750 CAPSULE, EXTENDED RELEASE ORAL at 11:02

## 2018-02-09 RX ADMIN — LISINOPRIL 30 MG: 20 TABLET ORAL at 08:02

## 2018-02-09 RX ADMIN — INSULIN ASPART 3 UNITS: 100 INJECTION, SOLUTION INTRAVENOUS; SUBCUTANEOUS at 08:02

## 2018-02-09 RX ADMIN — INSULIN ASPART 5 UNITS: 100 INJECTION, SOLUTION INTRAVENOUS; SUBCUTANEOUS at 11:02

## 2018-02-09 NOTE — SUBJECTIVE & OBJECTIVE
Subjective:     Interval History:     No acute events overnight, patient passing flatus this AM. Some incisional abdominal pain , ambulating. No bowel movements yet    Post-Op Info:  Procedure(s) (LRB):  COLECTOMY-RIGHT, extended (N/A)  HYSTERECTOMY-ABDOMINAL-TOTAL (MARLENE) (Bilateral)  [id number]-[id number] (BSO) (Bilateral)   4 Days Post-Op      Medications:  Continuous Infusions:    Scheduled Meds:   acetaminophen  650 mg Oral Q6H    carvedilol  6.25 mg Oral BID    enoxparin  40 mg Subcutaneous Q12H    ibuprofen  800 mg Oral Q8H    lisinopril  30 mg Oral Daily    mupirocin  1 g Nasal BID    polyethylene glycol  17 g Oral Daily     PRN Meds:   dextrose 50%    diphenhydrAMINE    glucagon (human recombinant)    glucagon (human recombinant)    hydrALAZINE    insulin aspart    naloxone    ondansetron    oxyCODONE    oxyCODONE    promethazine (PHENERGAN) IVPB        Objective:     Vital Signs (Most Recent):  Temp: 98.4 °F (36.9 °C) (02/09/18 0743)  Pulse: 77 (02/09/18 0813)  Resp: 18 (02/09/18 0743)  BP: (!) 179/80 (02/09/18 0813)  SpO2: 99 % (02/09/18 0743) Vital Signs (24h Range):  Temp:  [96.8 °F (36 °C)-98.4 °F (36.9 °C)] 98.4 °F (36.9 °C)  Pulse:  [69-85] 77  Resp:  [14-18] 18  SpO2:  [97 %-100 %] 99 %  BP: (116-190)/(57-80) 179/80     Intake/Output - Last 3 Shifts       02/07 0700 - 02/08 0659 02/08 0700 - 02/09 0659 02/09 0700 - 02/10 0659    P.O. 995 600     I.V. (mL/kg) 1808.8 (16.9)      IV Piggyback 500 500     Total Intake(mL/kg) 3303.8 (30.9) 1100 (10.3)     Urine (mL/kg/hr) 2520 (1) 1250 (0.5)     Emesis/NG output       Other       Stool       Blood       Total Output 2520 1250      Net +783.8 -150             Stool Occurrence   0 x          Physical Exam       General: In no acute distress, well appearance.   CV: RRR, S1, S2 no murmur or gallops   Pulm: non labored breathing, b/l clear breath sounds.   Abd: Obese, soft, mildly distended, appropriately tender to palpation around  incision site. . Incision with glue c/d/i. Small area of possible seroma vs hernia at the most superior aspect of the incision, slightly painful around area  : No worthy in place.   Ext: wwp      Significant Labs:  BMP (Last 3 Results):     Recent Labs  Lab 02/07/18  0617 02/08/18  0404 02/09/18  0503   * 137* 139*    138 139   K 3.2* 3.7 3.6    109 110   CO2 23 22* 19*   BUN 2* 3* 5*   CREATININE 0.8 0.7 0.7   CALCIUM 8.4* 8.8 8.7   MG 1.8 1.8 1.7     CBC (Last 3 Results):     Recent Labs  Lab 02/07/18  0617 02/08/18  0404 02/09/18  0503   WBC 8.82 7.41 7.30   RBC 3.86* 4.25 3.98*   HGB 8.9* 9.9* 9.3*   HCT 27.2* 30.5* 28.0*    279 270   MCV 71* 72* 70*   MCH 23.1* 23.3* 23.4*   MCHC 32.7 32.5 33.2

## 2018-02-09 NOTE — TELEPHONE ENCOUNTER
----- Message from Ann Stevens MA sent at 2/9/2018 11:20 AM CST -----  Contact: Mirta Christian  Aunt  [phone]  States her niece is still in the hospital and will not be discharges thus she needs to speak to you regarding staying longer at the [facility].

## 2018-02-09 NOTE — ASSESSMENT & PLAN NOTE
54 yo F 4 Days Post-Op extended right hemicolectomy and MARLENE/BSO by gyn onc still passing flatus    - low res diet today  - Await return of full bowel function  - PO oxycodone.   - OOB ambulate, PT/OT  - Daily labs  - DVT/GI ppx   - Voiding and ambulating.

## 2018-02-09 NOTE — PLAN OF CARE
Problem: Patient Care Overview  Goal: Plan of Care Review  Outcome: Ongoing (interventions implemented as appropriate)  Plan of care reviewed with patient. Patient verbalized understanding. Patient is AAO and VSS. No complaint of pain during shift. No complaint of nausea or vomiting. Voids on own with good output. Tolerating diet well. Accucheks performed ACHS. Correction insulin given.  Ambulates independently. Free of falls and injury. Will continue to monitor. Tracie Diaz RN

## 2018-02-09 NOTE — TELEPHONE ENCOUNTER
Spoke with patient regarding her [facility] stay.  I refaxed the form with a longer stay due to the patient possibly staying in the hospital longer.

## 2018-02-09 NOTE — PROGRESS NOTES
[facility]  Colorectal Surgery  Progress Note    Patient Name: Raegan Trinidad  MRN: [id number]  Admission Date: 2/5/2018  Hospital Length of Stay: 4 days  Attending Physician: Miguel Leggett MD    Subjective:     Interval History:     No acute events overnight, patient passing flatus this AM. Some incisional abdominal pain , ambulating. No bowel movements yet    Post-Op Info:  Procedure(s) (LRB):  COLECTOMY-RIGHT, extended (N/A)  HYSTERECTOMY-ABDOMINAL-TOTAL (MARLENE) (Bilateral)  [id number]-[id number]-ZCSYFVMQG (BSO) (Bilateral)   4 Days Post-Op      Medications:  Continuous Infusions:    Scheduled Meds:   acetaminophen  650 mg Oral Q6H    carvedilol  6.25 mg Oral BID    enoxparin  40 mg Subcutaneous Q12H    ibuprofen  800 mg Oral Q8H    lisinopril  30 mg Oral Daily    mupirocin  1 g Nasal BID    polyethylene glycol  17 g Oral Daily     PRN Meds:   dextrose 50%    diphenhydrAMINE    glucagon (human recombinant)    glucagon (human recombinant)    hydrALAZINE    insulin aspart    naloxone    ondansetron    oxyCODONE    oxyCODONE    promethazine (PHENERGAN) IVPB        Objective:     Vital Signs (Most Recent):  Temp: 98.4 °F (36.9 °C) (02/09/18 0743)  Pulse: 77 (02/09/18 0813)  Resp: 18 (02/09/18 0743)  BP: (!) 179/80 (02/09/18 0813)  SpO2: 99 % (02/09/18 0743) Vital Signs (24h Range):  Temp:  [96.8 °F (36 °C)-98.4 °F (36.9 °C)] 98.4 °F (36.9 °C)  Pulse:  [69-85] 77  Resp:  [14-18] 18  SpO2:  [97 %-100 %] 99 %  BP: (116-190)/(57-80) 179/80     Intake/Output - Last 3 Shifts       02/07 0700 - 02/08 0659 02/08 0700 - 02/09 0659 02/09 0700 - 02/10 0659    P.O. 995 600     I.V. (mL/kg) 1808.8 (16.9)      IV Piggyback 500 500     Total Intake(mL/kg) 3303.8 (30.9) 1100 (10.3)     Urine (mL/kg/hr) 2520 (1) 1250 (0.5)     Emesis/NG output       Other       Stool       Blood       Total Output 2520 1250      Net +783.8 -150             Stool Occurrence   0 x          Physical Exam        General: In no acute distress, well appearance.   CV: RRR, S1, S2 no murmur or gallops   Pulm: non labored breathing, b/l clear breath sounds.   Abd: Obese, soft, mildly distended, appropriately tender to palpation around incision site. . Incision with glue c/d/i. Small area of possible seroma vs hernia at the most superior aspect of the incision, slightly painful around area  : No worthy in place.   Ext: wwp      Significant Labs:  BMP (Last 3 Results):     Recent Labs  Lab 02/07/18  0617 02/08/18  0404 02/09/18  0503   * 137* 139*    138 139   K 3.2* 3.7 3.6    109 110   CO2 23 22* 19*   BUN 2* 3* 5*   CREATININE 0.8 0.7 0.7   CALCIUM 8.4* 8.8 8.7   MG 1.8 1.8 1.7     CBC (Last 3 Results):     Recent Labs  Lab 02/07/18  0617 02/08/18  0404 02/09/18  0503   WBC 8.82 7.41 7.30   RBC 3.86* 4.25 3.98*   HGB 8.9* 9.9* 9.3*   HCT 27.2* 30.5* 28.0*    279 270   MCV 71* 72* 70*   MCH 23.1* 23.3* 23.4*   MCHC 32.7 32.5 33.2           Assessment/Plan:     * Cancer of right colon    54 yo F 4 Days Post-Op extended right hemicolectomy and MARLENE/BSO by gyn onc still passing flatus    - low res diet today  - Await return of full bowel function  - PO oxycodone.   - OOB ambulate, PT/OT  - Daily labs  - DVT/GI ppx   - Voiding and ambulating.               Type 2 diabetes mellitus without complication, without long-term current use of insulin    On ISSS    Increased the dose of the sliding scale.         Essential hypertension    On home HTN medicine        Ovarian mass, right    S/p MARLENE BSO              Misha Juarez MD  Colorectal Surgery  [facility]

## 2018-02-09 NOTE — PLAN OF CARE
Problem: Patient Care Overview  Goal: Plan of Care Review  Outcome: Ongoing (interventions implemented as appropriate)  No acute events overnight. Denied pain, controlled with scheduled tylenol and ibuprofen. Tolerating low fiber/low residue diet with no nausea reported. Hypertensive overnight requiring 1 dose PRN hydralazine for SBP >170. All other vitals stable on room air. Blood sugar checked before bed with SSI coverage administered. Abdominal binder in place, incision clean and intact. Urinating adequate amounts in toilet, up ad oliver. Repositions in bed independently. Understands and agrees with plan of care. Slept well between care. Instructed to call for assistance as needed, call light in reach. Frequent rounds made for patient's safety. See flowsheets for detailed assessment. Continuing to monitor.

## 2018-02-10 NOTE — NURSING
Education given to pt. Patient verbalized understanding. All questions answered. Peripheral IV removed with catheter intact. RX given to pt. Awaiting transport for discharge.  Will continue to monitor. Tracie Diaz RN

## 2018-02-10 NOTE — DISCHARGE SUMMARY
[facility]  Colorectal Surgery  Discharge Summary      Patient Name: Raegan Trinidad  MRN: [id number]  Admission Date: 2/5/2018  Hospital Length of Stay: 4 days  Discharge Date and Time: 2/9/2018  7:27 PM  Attending Physician: No att. providers found   Discharging Provider: Misha Juarez MD  Primary Care Provider: BEN Watson MD     HPI: Patient initially saw Dr. Meliza Stevens with abnormal uterine bleeding.  EMBX was negative. She was given Aygestin for this.  Initially she had a ultrasound done that showed a 7 cm right ovarian cyst.  Tumor markers were done that included a [place] and inhibin.  The inhibin B was elevated at 155.7 pg/mL. She underwent a screening colonoscopy in November 2017  and was found to ave joel intra-luminal mass in the right hepatic flexure.  A biopsy was consistent with primary adenocarcinoma.  The patient had a PET scan done in Nov 2017  that showed increased FDG uptake in the hepatic flexure with that SUV of 7.8.The pelvic mass was PET negative.      A CT scan of the abdomen and pelvis in Nov 2017 showed a 7 cm right adnexal mass.  Endometrial stripe of 16 mm.     The patient was initially scheduled for a  right hemicolectomy with  Dr. Vivek Vazquez.  She was also seen by Dr. Pancho Canada, a gynecologic oncologist, in [place], [place].  The plan was for a combined procedure with a total abdominal hysterectomy and bilateral salpingo-oophorectomy and right hemicolectomy.     Procedure(s) (LRB):  COLECTOMY-RIGHT, extended (N/A)  HYSTERECTOMY-ABDOMINAL-TOTAL (MARLENE) (Bilateral)  [id number] (BSO) (Bilateral)     Hospital Course:  2/5/18: Patient underwent the above described procedures, some HTN immediately postop controlled w/ PRN medications  2/6/18: Tolerating clears, no return of bowel function  2/7/18: Tolerating clears no bowel function  2/8/18: Passing flatus, advance to low res diet  2/9/18: pain well controlled passing stool, stable for discharge  with appropriate follow up.         Significant Diagnostic Studies: Labs:   BMP:   Recent Labs  Lab 02/09/18  0503   *      K 3.6      CO2 19*   BUN 5*   CREATININE 0.7   CALCIUM 8.7   MG 1.7    and CBC   Recent Labs  Lab 02/09/18  0503   WBC 7.30   HGB 9.3*   HCT 28.0*          Pending Diagnostic Studies:     None        Final Active Diagnoses:    Diagnosis Date Noted POA    PRINCIPAL PROBLEM:  Cancer of right colon [C18.2] 01/25/2018 Yes    Ovarian mass, right [N83.9] 01/25/2018 Yes    Type 2 diabetes mellitus without complication, without long-term current use of insulin [E11.9] 01/25/2018 Yes    Essential hypertension [I10] 01/25/2018 Yes      Problems Resolved During this Admission:    Diagnosis Date Noted Date Resolved POA      Discharged Condition: good    Disposition: Home or Self Care    Follow Up:  Follow-up Information     Miguel Leggett MD In 2 weeks.    Specialty:  Colon and Rectal Surgery  Contact information:  [address] [place] [address] [id number] [phone]                 Patient Instructions:     Diet Adult Regular     Activity as tolerated     Lifting restrictions     Weight bearing restrictions (specify)     Notify your health care provider if you experience any of the following:  temperature >100.4     Notify your health care provider if you experience any of the following:  persistent nausea and vomiting or diarrhea     Notify your health care provider if you experience any of the following:  severe uncontrolled pain     Notify your health care provider if you experience any of the following:  redness, tenderness, or signs of infection (pain, swelling, redness, odor or green/yellow discharge around incision site)     Notify your health care provider if you experience any of the following:  difficulty breathing or increased cough     Notify your health care provider if you experience any of the following:  severe persistent headache     Notify your health care  provider if you experience any of the following:  worsening rash     Notify your health care provider if you experience any of the following:  persistent dizziness, light-headedness, or visual disturbances     Notify your health care provider if you experience any of the following:  increased confusion or weakness       Medications:  Reconciled Home Medications:   Discharge Medication List as of 2/9/2018  6:35 PM      START taking these medications    Details   oxyCODONE (ROXICODONE) 5 MG immediate release tablet Take 1 tablet (5 mg total) by mouth every 4 (four) hours as needed., Starting Fri 2/9/2018, Print      polyethylene glycol (GLYCOLAX) 17 gram PwPk Take 17 g by mouth once daily., Starting Sat 2/10/2018, Normal         CONTINUE these medications which have NOT CHANGED    Details   carvedilol (COREG) 6.25 MG tablet Starting Tue 1/23/2018, Historical Med      FERROUS GLUCONATE (FERGON) 270 mg (27 mg iron) Tab Take 27 mg by mouth 3 (three) times daily., Historical Med      JANUVIA 100 mg Tab Take 100 mg by mouth once daily. , Starting Thu 1/4/2018, Historical Med      lisinopril (PRINIVIL,ZESTRIL) 30 MG tablet Take 30 mg by mouth once daily. , Starting Thu 1/4/2018, Historical Med      metFORMIN (GLUCOPHAGE) 1000 MG tablet Take 1,000 mg by mouth 2 (two) times daily with meals. , Starting Thu 1/4/2018, Historical Med      metroNIDAZOLE (FLAGYL) 500 MG tablet On day before surgery, take 500mg by mouth at 1pm, 2pm, and 11pm., Normal      TRUE METRIX GLUCOSE TEST STRIP Strp Starting Wed 11/22/2017, Historical Med      ULTRA THIN LANCETS 30 gauge Misc Starting Wed 11/22/2017, Historical Med             Misha Juarez MD  Colorectal Surgery  [facility]

## 2018-02-12 NOTE — PHYSICIAN QUERY
PT Name: Raegan Trinidad  MR #: [id number]    Physician Query Form - Pathology Findings Clarification     CDS/: JULIO Belle,RNC-MNN           Contact information:[email or web address]  This form is a permanent document in the medical record.     Query Date: February 12, 2018      By submitting this query, we are merely seeking further clarification of documentation.  Please utilize your independent clinical judgment when addressing the question(s) below.      The medical record contains the following:     Findings Supporting Clinical Information Location in Medical Record   Tumor Site:  ___ Right ovary  Ovarian Surface Involvement:  ___ Absent  Fallopian Tube Surface Involvement:  ___ Absent  Tumor Size:  ___ 2.7 cm in greatest dimension  Histological Type:  ___ Granulosa cell tumor, adult type  Histological Grade: Not applicable                                 PROCEDURE:  Total abdominal hysterectomy with bilateral salpingo-oophorectomy.    PREOPERATIVE DIAGNOSIS:  Right ovarian mass    POSTOPERATIVE DIAGNOSIS:  Uterine fibroids Pathology report 2/5                                Op note 2/8     Please document the clinical significance of the Pathologists findings of   Tumor Site:  ___ Right ovary  Ovarian Surface Involvement:  ___ Absent  Fallopian Tube Surface Involvement:  ___ Absent  Tumor Size:  ___ 2.7 cm in greatest dimension  Histological Type:  ___ Granulosa cell tumor, adult type  Histological Grade: Not applicable.          [x  ] I agree with the Pathology Findings        [  ] I do not agree with the Pathology Findings        [  ] Clinically Insignificant        [  ] Clinically Undetermined        [  ] Other/Clarification of Findings: ______________________________________________    Please document in your progress notes daily for the duration of treatment until resolved and include in your discharge summary.

## 2018-02-15 DIAGNOSIS — E11.9 TYPE 2 DIABETES MELLITUS WITHOUT COMPLICATION, WITHOUT LONG-TERM CURRENT USE OF INSULIN: ICD-10-CM

## 2018-02-15 PROBLEM — B37.31 VAGINAL YEAST INFECTION: Status: ACTIVE | Noted: 2018-02-15

## 2018-02-15 RX ORDER — FLUCONAZOLE 150 MG/1
150 TABLET ORAL ONCE
Qty: 1 TABLET | Refills: 0 | Status: SHIPPED | OUTPATIENT
Start: 2018-02-15 | End: 2018-02-15

## 2018-02-22 ENCOUNTER — OFFICE VISIT (OUTPATIENT)
Dept: SURGERY | Facility: CLINIC | Age: 53
End: 2018-02-22
Payer: COMMERCIAL

## 2018-02-22 VITALS
SYSTOLIC BLOOD PRESSURE: 203 MMHG | HEIGHT: 66 IN | DIASTOLIC BLOOD PRESSURE: 80 MMHG | HEART RATE: 75 BPM | BODY MASS INDEX: 37.16 KG/M2 | WEIGHT: 231.25 LBS

## 2018-02-22 DIAGNOSIS — Z85.038 HISTORY OF COLON CANCER, STAGE II: ICD-10-CM

## 2018-02-22 PROBLEM — C18.2 CANCER OF RIGHT COLON: Status: RESOLVED | Noted: 2018-01-25 | Resolved: 2018-02-22

## 2018-02-22 PROCEDURE — 99024 POSTOP FOLLOW-UP VISIT: CPT | Mod: S$GLB,,, | Performed by: COLON & RECTAL SURGERY

## 2018-02-22 PROCEDURE — 99999 PR PBB SHADOW E&M-EST. PATIENT-LVL III: CPT | Mod: PBBFAC,,, | Performed by: COLON & RECTAL SURGERY

## 2018-02-22 RX ORDER — OXYCODONE HYDROCHLORIDE 5 MG/1
5 TABLET ORAL EVERY 4 HOURS PRN
Qty: 60 TABLET | Refills: 0 | Status: SHIPPED | OUTPATIENT
Start: 2018-02-22 | End: 2018-08-23

## 2018-02-22 NOTE — PROGRESS NOTES
Patient ID:  Raegan Trinidad is a 53 y.o. female     Chief Complaint:   No chief complaint on file.       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  Patient initially saw Dr. Meliza Stevens with abnormal uterine bleeding.  EMBX was negative. She was given Aygestin for this.  Initially she had a ultrasound done that showed a 7 cm right ovarian cyst.  Tumor markers were done that included a [place] and inhibin.  The inhibin B was elevated at 155.7 pg/mL. She underwent a screening colonoscopy in November 2017  and was found to ave joel intra-luminal mass in the right hepatic flexure.  A biopsy was consistent with primary adenocarcinoma.  The patient had a PET scan done in Nov 2017  that showed increased FDG uptake in the hepatic flexure with that SUV of 7.8.The pelvic mass was PET negative.      A CT scan of the abdomen and pelvis in Nov 2017 showed a 7 cm right adnexal mass.  Endometrial stripe of 16 mm.    The patient was initially scheduled for a  right hemicolectomy with  Dr. Vivek Vazquez.  She was also seen by Dr. Pancho Canada, a gynecologic oncologist, in [place], [place].  The plan was for a combined procedure with a total abdominal hysterectomy and bilateral salpingo-oophorectomy and right hemicolectomy.     ROS:        Constitutional: No fever, chills, activity or appetite change.      HENT: No hearing loss, facial swelling, neck pain or stiffness.       Eyes: No discharge, itching and visual disturbance.      Respiratory: No apnea, cough, choking or shortness of breath.       Cardiovascular: No leg swelling or chest pain      Gastrointestinal: No abdominal distention or change in bowel habbits     Genitourinary: No dysuria, frequency or flank pain.      Musculoskeletal: No arthralgias or gait problem.      Neurological: No dizziness, seizures or weakness.      Hematological: No adenopathy.      Psychiatric/Behavioral: No  hallucinations or behavioral problems.     PMH: Diabetes, HTN    PE:    APPEARANCE: Well nourished, well developed, in no acute distress.   CHEST: Lungs clear. Normal respiratory effort.  CARDIOVASCULAR: Normal rhythm. No edema.  ABDOMEN: Soft. No tenderness or masses.  Rectum:  Normal skin, NST, no masses or tenderness per Dr Estevez    Musculoskeletal: Symmetric, normal range of motion and strength.   Neurological: Alert and oriented to person, place, and time. Normal reflexes.   Skin: Skin is warm and dry.   Psychiatric: Normal mood and affect. Behavior is normal and appropriate.     Impression: T3N0 colon cancer  PLAN: RTC 3 months with CEA, refil pain meds.

## 2018-02-26 ENCOUNTER — TELEPHONE (OUTPATIENT)
Dept: SURGERY | Facility: CLINIC | Age: 53
End: 2018-02-26

## 2018-02-26 NOTE — TELEPHONE ENCOUNTER
Spoke with pt and she stated she needs paperwork redone to match the letter given to her to return to work on the 19th.

## 2018-02-26 NOTE — TELEPHONE ENCOUNTER
----- Message from Vanda Johana sent at 2/26/2018 11:14 AM CST -----  Contact: self - [phone]  Oleksandr - needs to talk to you re resending some papers that you signed for her - please call patient at

## 2018-04-23 ENCOUNTER — HISTORICAL (OUTPATIENT)
Dept: RADIOLOGY | Facility: HOSPITAL | Age: 53
End: 2018-04-23

## 2018-04-30 ENCOUNTER — HISTORICAL (OUTPATIENT)
Dept: RADIOLOGY | Facility: HOSPITAL | Age: 53
End: 2018-04-30

## 2018-05-23 ENCOUNTER — OFFICE VISIT (OUTPATIENT)
Dept: SURGERY | Facility: CLINIC | Age: 53
End: 2018-05-23
Payer: COMMERCIAL

## 2018-05-23 ENCOUNTER — LAB VISIT (OUTPATIENT)
Dept: LAB | Facility: HOSPITAL | Age: 53
End: 2018-05-23
Payer: COMMERCIAL

## 2018-05-23 VITALS
HEART RATE: 61 BPM | BODY MASS INDEX: 37.63 KG/M2 | SYSTOLIC BLOOD PRESSURE: 166 MMHG | HEIGHT: 66 IN | DIASTOLIC BLOOD PRESSURE: 71 MMHG | WEIGHT: 234.13 LBS

## 2018-05-23 DIAGNOSIS — Z85.038 HISTORY OF COLON CANCER, STAGE II: Primary | ICD-10-CM

## 2018-05-23 DIAGNOSIS — Z85.038 HISTORY OF COLON CANCER, STAGE II: ICD-10-CM

## 2018-05-23 LAB — CEA SERPL-MCNC: <1 NG/ML

## 2018-05-23 PROCEDURE — 3078F DIAST BP <80 MM HG: CPT | Mod: CPTII,S$GLB,, | Performed by: COLON & RECTAL SURGERY

## 2018-05-23 PROCEDURE — 3077F SYST BP >= 140 MM HG: CPT | Mod: CPTII,S$GLB,, | Performed by: COLON & RECTAL SURGERY

## 2018-05-23 PROCEDURE — 99214 OFFICE O/P EST MOD 30 MIN: CPT | Mod: S$GLB,,, | Performed by: COLON & RECTAL SURGERY

## 2018-05-23 PROCEDURE — 3008F BODY MASS INDEX DOCD: CPT | Mod: CPTII,S$GLB,, | Performed by: COLON & RECTAL SURGERY

## 2018-05-23 PROCEDURE — 82378 CARCINOEMBRYONIC ANTIGEN: CPT

## 2018-05-23 PROCEDURE — 99999 PR PBB SHADOW E&M-EST. PATIENT-LVL III: CPT | Mod: PBBFAC,,, | Performed by: COLON & RECTAL SURGERY

## 2018-05-23 PROCEDURE — 36415 COLL VENOUS BLD VENIPUNCTURE: CPT

## 2018-05-23 RX ORDER — ATORVASTATIN CALCIUM 10 MG/1
10 TABLET, FILM COATED ORAL DAILY
COMMUNITY

## 2018-05-23 RX ORDER — METFORMIN HYDROCHLORIDE 1000 MG/1
1000 TABLET ORAL 2 TIMES DAILY WITH MEALS
COMMUNITY

## 2018-05-23 NOTE — PROGRESS NOTES
Patient ID:  Raegan Trinidad is a 53 y.o. female     Chief Complaint:   No chief complaint on file.       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  Patient initially saw Dr. Meliza Stevens with abnormal uterine bleeding.  EMBX was negative. She was given Aygestin for this.  Initially she had a ultrasound done that showed a 7 cm right ovarian cyst.  Tumor markers were done that included a [place] and inhibin.  The inhibin B was elevated at 155.7 pg/mL. She underwent a screening colonoscopy in November 2017  and was found to ave joel intra-luminal mass in the right hepatic flexure.  A biopsy was consistent with primary adenocarcinoma.  The patient had a PET scan done in Nov 2017  that showed increased FDG uptake in the hepatic flexure with that SUV of 7.8.The pelvic mass was PET negative.      A CT scan of the abdomen and pelvis in Nov 2017 showed a 7 cm right adnexal mass.  Endometrial stripe of 16 mm.    The patient was initially scheduled for a  right hemicolectomy with  Dr. Vivek Vazquez.  She was also seen by Dr. Pancho Canada, a gynecologic oncologist, in [place], [place].  The plan was for a combined procedure with a total abdominal hysterectomy and bilateral salpingo-oophorectomy and right hemicolectomy.     ROS:        Constitutional: No fever, chills, activity or appetite change.      HENT: No hearing loss, facial swelling, neck pain or stiffness.       Eyes: No discharge, itching and visual disturbance.      Respiratory: No apnea, cough, choking or shortness of breath.       Cardiovascular: No leg swelling or chest pain      Gastrointestinal: No abdominal distention or change in bowel habbits     Genitourinary: No dysuria, frequency or flank pain.      Musculoskeletal: No arthralgias or gait problem.      Neurological: No dizziness, seizures or weakness.      Hematological: No adenopathy.       Psychiatric/Behavioral: No hallucinations or behavioral problems.     PMH: Diabetes, HTN    PE:    APPEARANCE: Well nourished, well developed, in no acute distress.   CHEST: Lungs clear. Normal respiratory effort.  CARDIOVASCULAR: Normal rhythm. No edema.  ABDOMEN: Soft. No tenderness or masses.  Rectum:  Normal skin, NST, no masses or tenderness per Dr Estevez    Musculoskeletal: Symmetric, normal range of motion and strength.   Neurological: Alert and oriented to person, place, and time. Normal reflexes.   Skin: Skin is warm and dry.   Psychiatric: Normal mood and affect. Behavior is normal and appropriate.     Impression: T3N0 colon cancer  PLAN: CEA, F/U in [place] in 3 months

## 2018-05-23 NOTE — LETTER
May 23, 2018      DUANE Watson MD  [address] [place] [address]           [facility]  [address]  [place] [address]  Phone: [phone]          Patient: Raegan Trinidad   MR Number: [id number]   YOB: 1965   Date of Visit: 5/23/2018       Dear Dr. DUANE Watson:    Thank you for referring Raegan Trinidad to me for evaluation. Attached you will find relevant portions of my assessment and plan of care.    If you have questions, please do not hesitate to call me. I look forward to following Raegan Trinidad along with you.    Sincerely,    Miguel Leggett MD    Enclosure  CC:  No Recipients    If you would like to receive this communication electronically, please contact [email or web address] or [phone] to request more information on [employer] Link access.    For providers and/or their staff who would like to refer a patient to [facility], please contact us through our one-stop-shop provider referral line, [facility] , at [phone].    If you feel you have received this communication in error or would no longer like to receive these types of communications, please e-mail [email or web address]

## 2018-08-23 ENCOUNTER — LAB VISIT (OUTPATIENT)
Dept: LAB | Facility: HOSPITAL | Age: 53
End: 2018-08-23
Attending: COLON & RECTAL SURGERY
Payer: COMMERCIAL

## 2018-08-23 ENCOUNTER — OFFICE VISIT (OUTPATIENT)
Dept: SURGERY | Facility: CLINIC | Age: 53
End: 2018-08-23
Payer: COMMERCIAL

## 2018-08-23 VITALS
WEIGHT: 224.19 LBS | HEIGHT: 66 IN | DIASTOLIC BLOOD PRESSURE: 62 MMHG | SYSTOLIC BLOOD PRESSURE: 139 MMHG | HEART RATE: 65 BPM | BODY MASS INDEX: 36.03 KG/M2

## 2018-08-23 DIAGNOSIS — Z85.038 HISTORY OF COLON CANCER: Primary | ICD-10-CM

## 2018-08-23 DIAGNOSIS — Z85.038 HISTORY OF COLON CANCER: ICD-10-CM

## 2018-08-23 LAB — CEA SERPL-MCNC: <1 NG/ML

## 2018-08-23 PROCEDURE — 3078F DIAST BP <80 MM HG: CPT | Mod: CPTII,S$GLB,, | Performed by: COLON & RECTAL SURGERY

## 2018-08-23 PROCEDURE — 3008F BODY MASS INDEX DOCD: CPT | Mod: CPTII,S$GLB,, | Performed by: COLON & RECTAL SURGERY

## 2018-08-23 PROCEDURE — 99999 PR PBB SHADOW E&M-EST. PATIENT-LVL III: CPT | Mod: PBBFAC,,, | Performed by: COLON & RECTAL SURGERY

## 2018-08-23 PROCEDURE — 3075F SYST BP GE 130 - 139MM HG: CPT | Mod: CPTII,S$GLB,, | Performed by: COLON & RECTAL SURGERY

## 2018-08-23 PROCEDURE — 82378 CARCINOEMBRYONIC ANTIGEN: CPT

## 2018-08-23 PROCEDURE — 36415 COLL VENOUS BLD VENIPUNCTURE: CPT

## 2018-08-23 PROCEDURE — 99214 OFFICE O/P EST MOD 30 MIN: CPT | Mod: S$GLB,,, | Performed by: COLON & RECTAL SURGERY

## 2018-08-23 RX ORDER — HYOSCYAMINE SULFATE 0.12 MG/1
0.12 TABLET, ORALLY DISINTEGRATING ORAL EVERY 6 HOURS PRN
Qty: 60 EACH | Refills: 2 | Status: SHIPPED | OUTPATIENT
Start: 2018-08-23 | End: 2019-02-27

## 2018-08-23 NOTE — PROGRESS NOTES
Patient ID:  Raegan Trinidad is a 53 y.o. female     Chief Complaint:   Chief Complaint   Patient presents with    Follow-up        HPI: Occasional constipation.    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  Patient initially saw Dr. Meliza Stevens with abnormal uterine bleeding.  EMBX was negative. She was given Aygestin for this.  Initially she had a ultrasound done that showed a 7 cm right ovarian cyst.  Tumor markers were done that included a [place] and inhibin.  The inhibin B was elevated at 155.7 pg/mL. She underwent a screening colonoscopy in November 2017  and was found to ave joel intra-luminal mass in the right hepatic flexure.  A biopsy was consistent with primary adenocarcinoma.  The patient had a PET scan done in Nov 2017  that showed increased FDG uptake in the hepatic flexure with that SUV of 7.8.The pelvic mass was PET negative.      A CT scan of the abdomen and pelvis in Nov 2017 showed a 7 cm right adnexal mass.  Endometrial stripe of 16 mm.    The patient was initially scheduled for a  right hemicolectomy with  Dr. Vivek Vazquez.  She was also seen by Dr. Pancho Canada, a gynecologic oncologist, in [place], [place].  The plan was for a combined procedure with a total abdominal hysterectomy and bilateral salpingo-oophorectomy and right hemicolectomy.     ROS:        Constitutional: No fever, chills, activity or appetite change.      HENT: No hearing loss, facial swelling, neck pain or stiffness.       Eyes: No discharge, itching and visual disturbance.      Respiratory: No apnea, cough, choking or shortness of breath.       Cardiovascular: No leg swelling or chest pain      Gastrointestinal: No abdominal distention or change in bowel habbits     Genitourinary: No dysuria, frequency or flank pain.      Musculoskeletal: No arthralgias or gait problem.      Neurological: No dizziness, seizures or  weakness.      Hematological: No adenopathy.      Psychiatric/Behavioral: No hallucinations or behavioral problems.     PMH: Diabetes, HTN    PE:    APPEARANCE: Well nourished, well developed, in no acute distress.   CHEST: Lungs clear. Normal respiratory effort.  CARDIOVASCULAR: Normal rhythm. No edema.  ABDOMEN: Soft. No tenderness or masses.Healed incision   Rectum:  Normal skin, NST, no masses or tenderness per Dr Estevez    Musculoskeletal: Symmetric, normal range of motion and strength.   Neurological: Alert and oriented to person, place, and time. Normal reflexes.   Skin: Skin is warm and dry.   Psychiatric: Normal mood and affect. Behavior is normal and appropriate.     Impression: T3N0 colon cancer  PLAN: CEA, F/U with MS Waters.

## 2018-08-29 ENCOUNTER — TELEPHONE (OUTPATIENT)
Dept: SURGERY | Facility: CLINIC | Age: 53
End: 2018-08-29

## 2018-08-29 NOTE — TELEPHONE ENCOUNTER
----- Message from Ciera Coffey sent at 8/29/2018 10:16 AM CDT -----  Contact: pt#[phone]  Test Results    Type of Test:lab  Date of Test:08/23  Communication Preference:callback   Additional Information:  
Informed pt CEA was normal.   
17-Feb-2017 11:20

## 2018-09-04 ENCOUNTER — HISTORICAL (OUTPATIENT)
Dept: RADIOLOGY | Facility: HOSPITAL | Age: 53
End: 2018-09-04

## 2018-09-18 ENCOUNTER — TELEPHONE (OUTPATIENT)
Dept: SURGERY | Facility: CLINIC | Age: 53
End: 2018-09-18

## 2018-09-19 ENCOUNTER — TELEPHONE (OUTPATIENT)
Dept: SURGERY | Facility: HOSPITAL | Age: 53
End: 2018-09-19

## 2018-09-19 NOTE — TELEPHONE ENCOUNTER
----- Message from Atiya Lynn sent at 9/14/2018  2:45 PM CDT -----  Contact: Pt:[phone]  .Needs Advice    Reason for call:Pt called and states she would like to change her appointment with Adali NP to 11/27/18. Pt would like a call back from the nurse.        Communication Preference:Pt:[phone]  Additional Information:

## 2018-11-27 ENCOUNTER — OFFICE VISIT (OUTPATIENT)
Dept: SURGERY | Facility: CLINIC | Age: 53
End: 2018-11-27
Payer: COMMERCIAL

## 2018-11-27 ENCOUNTER — LAB VISIT (OUTPATIENT)
Dept: LAB | Facility: HOSPITAL | Age: 53
End: 2018-11-27
Payer: COMMERCIAL

## 2018-11-27 VITALS
DIASTOLIC BLOOD PRESSURE: 74 MMHG | SYSTOLIC BLOOD PRESSURE: 174 MMHG | WEIGHT: 231.5 LBS | HEIGHT: 65 IN | HEART RATE: 70 BPM | BODY MASS INDEX: 38.57 KG/M2

## 2018-11-27 DIAGNOSIS — Z85.038 HISTORY OF COLON CANCER, STAGE II: ICD-10-CM

## 2018-11-27 DIAGNOSIS — Z85.038 HX OF COLON CANCER, STAGE II: ICD-10-CM

## 2018-11-27 DIAGNOSIS — Z85.038 HX OF COLON CANCER, STAGE II: Primary | ICD-10-CM

## 2018-11-27 LAB — CEA SERPL-MCNC: <1 NG/ML

## 2018-11-27 PROCEDURE — 3078F DIAST BP <80 MM HG: CPT | Mod: CPTII,S$GLB,, | Performed by: NURSE PRACTITIONER

## 2018-11-27 PROCEDURE — 99999 PR PBB SHADOW E&M-EST. PATIENT-LVL III: CPT | Mod: PBBFAC,,, | Performed by: NURSE PRACTITIONER

## 2018-11-27 PROCEDURE — 3008F BODY MASS INDEX DOCD: CPT | Mod: CPTII,S$GLB,, | Performed by: NURSE PRACTITIONER

## 2018-11-27 PROCEDURE — 99215 OFFICE O/P EST HI 40 MIN: CPT | Mod: S$GLB,,, | Performed by: NURSE PRACTITIONER

## 2018-11-27 PROCEDURE — 82378 CARCINOEMBRYONIC ANTIGEN: CPT

## 2018-11-27 PROCEDURE — 3077F SYST BP >= 140 MM HG: CPT | Mod: CPTII,S$GLB,, | Performed by: NURSE PRACTITIONER

## 2018-11-27 PROCEDURE — 36415 COLL VENOUS BLD VENIPUNCTURE: CPT

## 2018-11-28 NOTE — PROGRESS NOTES
Patient ID and HPI:  Raegan Trinidad is a 53 y.o. female who is being seen in survivorship clinic, OR 2/5/18 RNC and MARLENE with BSO, path [id number], per Dr. Zenaida bravo on uterus and BSO   pathology showed a granulosa cell. No further management is needed.  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  She lives out of town and her local GI doctor is planning on repeating colonoscopy in Feb 2019, suggested she also get a CT a/p.  CEA 3 months ago <1.0.  Repeat CEA 11/27 <1.0   Chief Complaint:   Chief Complaint   Patient presents with    Survivorship            ROS:        Constitutional: No fever, chills, activity or appetite change.      HENT: No hearing loss, facial swelling, neck pain or stiffness.       Eyes: No discharge, itching and visual disturbance.      Respiratory: No apnea, cough, choking or shortness of breath.       Cardiovascular: No leg swelling or chest pain      Gastrointestinal: No abdominal distention or change in bowel habbits     Genitourinary: No dysuria, frequency or flank pain.      Musculoskeletal: No arthralgias or gait problem.      Neurological: No dizziness, seizures or weakness.      Hematological: No adenopathy.      Psychiatric/Behavioral: No hallucinations or behavioral problems.       PE:    APPEARANCE: Well nourished, well developed, in no acute distress.   CHEST: Lungs clear. Normal respiratory effort.  CARDIOVASCULAR: Normal rhythm. No edema.  ABDOMEN: Soft. No tenderness or masses. Well healed abd inc line, no induration or eruythema   Rectum:  Normal skin, NST, no masses or tenderness    Musculoskeletal:  Symmetric, normal range of motion and strength.   Neurological: Alert and oriented to person, place, and time. Normal reflexes.   Skin: Skin is warm and dry.   Psychiatric: Normal mood and affect. Behavior is normal and appropriate.     Impression:    Encounter Diagnosis   Name Primary?    Hx of colon cancer, stage II Yes       PLAN:  Raegan was seen today for survivorship.    Diagnoses and all orders for this visit:    Hx of colon cancer, stage II  -     CEA; Standing        Follow Up:  Fu in survivorship clinic in 3 months, pt aware that Dr. Leggett is retiring and if any CRS issues arise she is willing to see another CRS provider here at [facility]    Greater then 50 minutes spent on reviewing chart, discussing pathology, discussing stress, performing exam and discussing fu

## 2019-02-27 ENCOUNTER — LAB VISIT (OUTPATIENT)
Dept: LAB | Facility: HOSPITAL | Age: 54
End: 2019-02-27
Payer: COMMERCIAL

## 2019-02-27 ENCOUNTER — OFFICE VISIT (OUTPATIENT)
Dept: SURGERY | Facility: CLINIC | Age: 54
End: 2019-02-27
Payer: COMMERCIAL

## 2019-02-27 VITALS
HEIGHT: 65 IN | SYSTOLIC BLOOD PRESSURE: 164 MMHG | DIASTOLIC BLOOD PRESSURE: 68 MMHG | WEIGHT: 240.31 LBS | HEART RATE: 59 BPM | BODY MASS INDEX: 40.04 KG/M2

## 2019-02-27 DIAGNOSIS — C18.9 MALIGNANT NEOPLASM OF COLON, UNSPECIFIED PART OF COLON: ICD-10-CM

## 2019-02-27 DIAGNOSIS — C18.9 MALIGNANT NEOPLASM OF COLON, UNSPECIFIED PART OF COLON: Primary | ICD-10-CM

## 2019-02-27 LAB
ANION GAP SERPL CALC-SCNC: 5 MMOL/L
BUN SERPL-MCNC: 12 MG/DL
CALCIUM SERPL-MCNC: 10.1 MG/DL
CEA SERPL-MCNC: <1 NG/ML
CHLORIDE SERPL-SCNC: 104 MMOL/L
CO2 SERPL-SCNC: 31 MMOL/L
CREAT SERPL-MCNC: 0.9 MG/DL
EST. GFR  (AFRICAN AMERICAN): >60 ML/MIN/1.73 M^2
EST. GFR  (NON AFRICAN AMERICAN): >60 ML/MIN/1.73 M^2
GLUCOSE SERPL-MCNC: 178 MG/DL
POTASSIUM SERPL-SCNC: 4.1 MMOL/L
SODIUM SERPL-SCNC: 140 MMOL/L

## 2019-02-27 PROCEDURE — 99215 OFFICE O/P EST HI 40 MIN: CPT | Mod: S$GLB,,, | Performed by: NURSE PRACTITIONER

## 2019-02-27 PROCEDURE — 99999 PR PBB SHADOW E&M-EST. PATIENT-LVL III: CPT | Mod: PBBFAC,,, | Performed by: NURSE PRACTITIONER

## 2019-02-27 PROCEDURE — 3077F SYST BP >= 140 MM HG: CPT | Mod: CPTII,S$GLB,, | Performed by: NURSE PRACTITIONER

## 2019-02-27 PROCEDURE — 3078F PR MOST RECENT DIASTOLIC BLOOD PRESSURE < 80 MM HG: ICD-10-PCS | Mod: CPTII,S$GLB,, | Performed by: NURSE PRACTITIONER

## 2019-02-27 PROCEDURE — 3008F BODY MASS INDEX DOCD: CPT | Mod: CPTII,S$GLB,, | Performed by: NURSE PRACTITIONER

## 2019-02-27 PROCEDURE — 3078F DIAST BP <80 MM HG: CPT | Mod: CPTII,S$GLB,, | Performed by: NURSE PRACTITIONER

## 2019-02-27 PROCEDURE — 36415 COLL VENOUS BLD VENIPUNCTURE: CPT

## 2019-02-27 PROCEDURE — 99999 PR PBB SHADOW E&M-EST. PATIENT-LVL III: ICD-10-PCS | Mod: PBBFAC,,, | Performed by: NURSE PRACTITIONER

## 2019-02-27 PROCEDURE — 82378 CARCINOEMBRYONIC ANTIGEN: CPT

## 2019-02-27 PROCEDURE — 3008F PR BODY MASS INDEX (BMI) DOCUMENTED: ICD-10-PCS | Mod: CPTII,S$GLB,, | Performed by: NURSE PRACTITIONER

## 2019-02-27 PROCEDURE — 80048 BASIC METABOLIC PNL TOTAL CA: CPT

## 2019-02-27 PROCEDURE — 3077F PR MOST RECENT SYSTOLIC BLOOD PRESSURE >= 140 MM HG: ICD-10-PCS | Mod: CPTII,S$GLB,, | Performed by: NURSE PRACTITIONER

## 2019-02-27 PROCEDURE — 99215 PR OFFICE/OUTPT VISIT, EST, LEVL V, 40-54 MIN: ICD-10-PCS | Mod: S$GLB,,, | Performed by: NURSE PRACTITIONER

## 2019-02-27 RX ORDER — CARVEDILOL 12.5 MG/1
TABLET ORAL
COMMUNITY
Start: 2018-12-06

## 2019-02-27 NOTE — LETTER
February 27, 2019      DUANE Watson MD  [address] [place] [address]           [facility]  [address]  [place] [address]  Phone: [phone]          Patient: Raegan Trinidad   MR Number: [id number]   YOB: 1965   Date of Visit: 2/27/2019       Dear Dr. DUANE Watson:    Thank you for referring Raegan Trinidad to me for evaluation. Attached you will find relevant portions of my assessment and plan of care.    If you have questions, please do not hesitate to call me. I look forward to following Raegan Trinidad along with you.    Sincerely,    Adali Waters, NP    Enclosure  CC:  No Recipients    If you would like to receive this communication electronically, please contact [email or web address] or [phone] to request more information on [employer] Link access.    For providers and/or their staff who would like to refer a patient to [facility], please contact us through our one-stop-shop provider referral line, [facility], at [phone].    If you feel you have received this communication in error or would no longer like to receive these types of communications, please e-mail [email or web address]

## 2019-02-27 NOTE — PROGRESS NOTES
Patient ID and HPI:  Raegan Trinidad is a 53 y.o. female who is being seen in survivorship clinic, OR 2/5/18 RNC and MARLENE with BSO, path [id number], per Dr. Zenaida bravo on uterus and BSO   pathology showed a granulosa cell. No further management is needed.  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  She lives out of town and her local GI doctor performed colonoscopy 2/19/19 and it was normal with with evidence of previous end to side ileo-colonic anastomosis, per NCCN guidelines fu scope in 3 years., still needs fu CT a/p, requesting that be done either in [place] or [place].  CEA 3 months ago <1.0.  Repeat CEA 11/27 <1.0, cea today <1       Chief Complaint:       Chief Complaint   Patient presents with    Survivorship               ROS:        Constitutional: No fever, chills, activity or appetite change.      HENT: No hearing loss, facial swelling, neck pain or stiffness.       Eyes: No discharge, itching and visual disturbance.      Respiratory: No apnea, cough, choking or shortness of breath.       Cardiovascular: No leg swelling or chest pain      Gastrointestinal: No abdominal distention or change in bowel habbits     Genitourinary: No dysuria, frequency or flank pain.      Musculoskeletal: No arthralgias or gait problem.      Neurological: No dizziness, seizures or weakness.      Hematological: No adenopathy.      Psychiatric/Behavioral: No hallucinations or behavioral problems.         PE:    APPEARANCE: Well nourished, well developed, in no acute distress.   CHEST: Lungs clear. Normal respiratory effort.  CARDIOVASCULAR: Normal  rhythm. No edema.  ABDOMEN: Soft. No tenderness or masses. Well healed abd inc line, no induration or eruythema   Rectum:  Normal skin, NST, no masses or tenderness    Musculoskeletal: Symmetric, normal range of motion and strength.   Neurological: Alert and oriented to person, place, and time. Normal reflexes.   Skin: Skin is warm and dry.   Psychiatric: Normal mood and affect. Behavior is normal and appropriate.      Impression:          Encounter Diagnosis   Name Primary?    Hx of colon cancer, stage II Yes         PLAN:  Raegan was seen today for survivorship.     Diagnoses and all orders for this visit:     Hx of colon cancer, stage II  -     CEA; Standing      will scan colonoscopoy report into chart      Follow Up:  Fu in survivorship clinic in 3 months, pt aware that Dr. Leggett retired and if any CRS issues arise she is willing to see another CRS provider here at [facility] on main campus or [facility] then 50 minutes spent on reviewing chart, discussing pathology, discussing stress, performing exam and discussing fu

## 2019-03-07 ENCOUNTER — HISTORICAL (OUTPATIENT)
Dept: LAB | Facility: HOSPITAL | Age: 54
End: 2019-03-07

## 2019-03-07 LAB
ALBUMIN SERPL-MCNC: 3.3 GM/DL (ref 3.4–5)
ALBUMIN/GLOB SERPL: 0.7 RATIO
ALP SERPL-CCNC: 106 UNIT/L (ref 30–113)
ALT SERPL-CCNC: 18 UNIT/L (ref 10–45)
AST SERPL-CCNC: 15 UNIT/L (ref 15–37)
BILIRUB SERPL-MCNC: 0.5 MG/DL (ref 0.1–0.9)
BILIRUBIN DIRECT+TOT PNL SERPL-MCNC: 0.1 MG/DL (ref 0–0.3)
BILIRUBIN DIRECT+TOT PNL SERPL-MCNC: 0.4 MG/DL
BUN SERPL-MCNC: 18 MG/DL (ref 10–20)
CALCIUM SERPL-MCNC: 9.3 MG/DL (ref 8–10.5)
CHLORIDE SERPL-SCNC: 106 MMOL/L (ref 100–108)
CHOLEST SERPL-MCNC: 153 MG/DL (ref 140–200)
CHOLEST/HDLC SERPL: 2 MG/DL (ref 0–8)
CO2 SERPL-SCNC: 29 MMOL/L (ref 21–35)
CREAT SERPL-MCNC: 0.97 MG/DL (ref 0.7–1.3)
GLOBULIN SER-MCNC: 4.6 GM/DL
GLUCOSE SERPL-MCNC: 181 MG/DL (ref 75–116)
HDLC SERPL-MCNC: 70 MG/DL (ref 35–59)
LDLC SERPL CALC-MCNC: 71 MG/DL (ref 100–129)
POTASSIUM SERPL-SCNC: 3.9 MMOL/L (ref 3.6–5.2)
PROT SERPL-MCNC: 7.9 GM/DL (ref 6.4–8.2)
SODIUM SERPL-SCNC: 142 MMOL/L (ref 135–145)
TRIGL SERPL-MCNC: 72 MG/DL (ref 35–150)
VLDLC SERPL CALC-MCNC: 14 MG/DL

## 2019-04-09 ENCOUNTER — HOSPITAL ENCOUNTER (OUTPATIENT)
Dept: RADIOLOGY | Facility: HOSPITAL | Age: 54
Discharge: HOME | End: 2019-04-09
Attending: NURSE PRACTITIONER
Payer: COMMERCIAL

## 2019-04-09 DIAGNOSIS — C18.9 MALIGNANT NEOPLASM OF COLON, UNSPECIFIED PART OF COLON: ICD-10-CM

## 2019-04-09 PROCEDURE — 25500020 PHARM REV CODE 255: Performed by: NURSE PRACTITIONER

## 2019-04-09 PROCEDURE — 74177 CT ABD & PELVIS W/CONTRAST: CPT | Mod: TC

## 2019-04-09 RX ADMIN — IOHEXOL 100 ML: 350 INJECTION, SOLUTION INTRAVENOUS at 02:04

## 2019-04-09 RX ADMIN — IOHEXOL 30 ML: 350 INJECTION, SOLUTION INTRAVENOUS at 01:04

## 2019-05-29 ENCOUNTER — LAB VISIT (OUTPATIENT)
Dept: LAB | Facility: HOSPITAL | Age: 54
End: 2019-05-29
Payer: COMMERCIAL

## 2019-05-29 DIAGNOSIS — Z85.038 HX OF COLON CANCER, STAGE II: ICD-10-CM

## 2019-05-29 LAB — CEA SERPL-MCNC: 1 NG/ML (ref 0–5)

## 2019-05-29 PROCEDURE — 36415 COLL VENOUS BLD VENIPUNCTURE: CPT

## 2019-05-29 PROCEDURE — 82378 CARCINOEMBRYONIC ANTIGEN: CPT

## 2019-12-04 ENCOUNTER — HISTORICAL (OUTPATIENT)
Dept: RADIOLOGY | Facility: HOSPITAL | Age: 54
End: 2019-12-04

## 2019-12-07 NOTE — ASSESSMENT & PLAN NOTE
CEA <1  Scheduled for repeat colonoscopy by local GI in Feb 2019

## 2020-04-15 ENCOUNTER — HISTORICAL (OUTPATIENT)
Dept: RADIOLOGY | Facility: HOSPITAL | Age: 55
End: 2020-04-15

## 2020-04-16 ENCOUNTER — HISTORICAL (OUTPATIENT)
Dept: RADIOLOGY | Facility: HOSPITAL | Age: 55
End: 2020-04-16

## 2020-05-05 ENCOUNTER — HISTORICAL (OUTPATIENT)
Dept: RADIOLOGY | Facility: HOSPITAL | Age: 55
End: 2020-05-05

## 2020-11-06 ENCOUNTER — HISTORICAL (OUTPATIENT)
Dept: RADIOLOGY | Facility: HOSPITAL | Age: 55
End: 2020-11-06

## 2021-09-07 ENCOUNTER — HISTORICAL (OUTPATIENT)
Dept: RADIOLOGY | Facility: HOSPITAL | Age: 56
End: 2021-09-07

## 2022-03-31 ENCOUNTER — HISTORICAL (OUTPATIENT)
Dept: ADMINISTRATIVE | Facility: HOSPITAL | Age: 57
End: 2022-03-31

## 2022-03-31 ENCOUNTER — HISTORICAL (OUTPATIENT)
Dept: RADIOLOGY | Facility: HOSPITAL | Age: 57
End: 2022-03-31

## 2022-04-30 NOTE — DISCHARGE SUMMARY
Patient:   Raegan Trinidad            MRN: [id number]            FIN: [id number]               Age:   52 years     Sex:  Female     :  1965   Associated Diagnoses:   Anemia   Author:   John Watson MD      Discharge Information      Discharge Summary Information   Admitted  1/3/2018   Discharged  1/3/2018   Admitting physician     John Watson MD.     Discharge diagnosis     Anemia ([id number] D64.9).        Physical Examination      No qualifying data available      Hospital Course   Hospital Course   Medical management: Patient in this day for outpatient transfusion PRBC's anticipating laparotomy soon per Dr Canada..

## 2023-02-03 DIAGNOSIS — Z12.31 ENCOUNTER FOR SCREENING MAMMOGRAM FOR BREAST CANCER: Primary | ICD-10-CM

## 2023-02-15 ENCOUNTER — HOSPITAL ENCOUNTER (OUTPATIENT)
Dept: RADIOLOGY | Facility: HOSPITAL | Age: 58
Discharge: HOME | End: 2023-02-15
Attending: FAMILY MEDICINE
Payer: COMMERCIAL

## 2023-02-15 DIAGNOSIS — Z12.31 ENCOUNTER FOR SCREENING MAMMOGRAM FOR BREAST CANCER: ICD-10-CM

## 2023-02-15 PROCEDURE — 77063 BREAST TOMOSYNTHESIS BI: CPT | Mod: 26,,, | Performed by: STUDENT IN AN ORGANIZED HEALTH CARE EDUCATION/TRAINING PROGRAM

## 2023-02-15 PROCEDURE — 77067 SCR MAMMO BI INCL CAD: CPT | Mod: TC

## 2023-02-15 PROCEDURE — 77063 MAMMO DIGITAL SCREENING BILAT WITH TOMO: ICD-10-PCS | Mod: 26,,, | Performed by: STUDENT IN AN ORGANIZED HEALTH CARE EDUCATION/TRAINING PROGRAM

## 2023-02-15 PROCEDURE — 77067 MAMMO DIGITAL SCREENING BILAT WITH TOMO: ICD-10-PCS | Mod: 26,,, | Performed by: STUDENT IN AN ORGANIZED HEALTH CARE EDUCATION/TRAINING PROGRAM

## 2023-02-15 PROCEDURE — 77067 SCR MAMMO BI INCL CAD: CPT | Mod: 26,,, | Performed by: STUDENT IN AN ORGANIZED HEALTH CARE EDUCATION/TRAINING PROGRAM

## 2023-02-20 ENCOUNTER — HOSPITAL ENCOUNTER (OUTPATIENT)
Dept: RADIOLOGY | Facility: HOSPITAL | Age: 58
Discharge: HOME | End: 2023-02-20
Attending: FAMILY MEDICINE
Payer: COMMERCIAL

## 2023-02-20 DIAGNOSIS — R92.8 ABNORMAL MAMMOGRAM: ICD-10-CM

## 2023-02-20 PROCEDURE — 77061 BREAST TOMOSYNTHESIS UNI: CPT | Mod: TC,RT

## 2023-02-20 PROCEDURE — 77065 DX MAMMO INCL CAD UNI: CPT | Mod: 26,RT,, | Performed by: STUDENT IN AN ORGANIZED HEALTH CARE EDUCATION/TRAINING PROGRAM

## 2023-02-20 PROCEDURE — 77061 BREAST TOMOSYNTHESIS UNI: CPT | Mod: 26,RT,, | Performed by: STUDENT IN AN ORGANIZED HEALTH CARE EDUCATION/TRAINING PROGRAM

## 2023-02-20 PROCEDURE — 77061 MAMMO DIGITAL DIAGNOSTIC RIGHT WITH TOMO: ICD-10-PCS | Mod: 26,RT,, | Performed by: STUDENT IN AN ORGANIZED HEALTH CARE EDUCATION/TRAINING PROGRAM

## 2023-02-20 PROCEDURE — 77065 MAMMO DIGITAL DIAGNOSTIC RIGHT WITH TOMO: ICD-10-PCS | Mod: 26,RT,, | Performed by: STUDENT IN AN ORGANIZED HEALTH CARE EDUCATION/TRAINING PROGRAM

## 2023-03-20 ENCOUNTER — HOSPITAL ENCOUNTER (OUTPATIENT)
Dept: RADIOLOGY | Facility: HOSPITAL | Age: 58
Discharge: HOME | End: 2023-03-20
Attending: FAMILY MEDICINE
Payer: COMMERCIAL

## 2023-03-20 DIAGNOSIS — R92.8 ABNORMAL MAMMOGRAM: Primary | ICD-10-CM

## 2023-03-20 PROCEDURE — 27201044 MAMMO BREAST STEREOTACTIC BIOPSY 1ST SITE COMPLETE

## 2023-03-20 PROCEDURE — 19081 MAMMO BREAST STEREOTACTIC BIOPSY 1ST SITE COMPLETE: ICD-10-PCS | Mod: RT,,, | Performed by: STUDENT IN AN ORGANIZED HEALTH CARE EDUCATION/TRAINING PROGRAM

## 2023-03-20 PROCEDURE — 19081 BX BREAST 1ST LESION STRTCTC: CPT | Mod: RT,,, | Performed by: STUDENT IN AN ORGANIZED HEALTH CARE EDUCATION/TRAINING PROGRAM

## 2023-03-21 LAB — PSYCHE PATHOLOGY RESULT: NORMAL

## 2023-04-25 ENCOUNTER — LAB VISIT (OUTPATIENT)
Dept: LAB | Facility: HOSPITAL | Age: 58
End: 2023-04-25
Attending: INTERNAL MEDICINE
Payer: COMMERCIAL

## 2023-04-25 DIAGNOSIS — Z85.038 PERSONAL HISTORY OF COLON CANCER: Primary | ICD-10-CM

## 2023-04-25 LAB
ALBUMIN SERPL-MCNC: 3.7 G/DL (ref 3.4–4.8)
ALBUMIN/GLOB SERPL: 1 RATIO (ref 1.1–2)
ALP SERPL-CCNC: 87 UNIT/L (ref 40–150)
ALT SERPL-CCNC: 18 UNIT/L (ref 0–55)
AST SERPL-CCNC: 15 UNIT/L (ref 5–34)
BASOPHILS # BLD AUTO: 0.03 X10(3)/MCL (ref 0–0.2)
BASOPHILS NFR BLD AUTO: 0.5 %
BILIRUBIN DIRECT+TOT PNL SERPL-MCNC: 0.2 MG/DL
BUN SERPL-MCNC: 27.3 MG/DL (ref 9.8–20.1)
CALCIUM SERPL-MCNC: 9.7 MG/DL (ref 8.4–10.2)
CEA SERPL-MCNC: <1.73 NG/ML (ref 0–3)
CHLORIDE SERPL-SCNC: 113 MMOL/L (ref 98–107)
CO2 SERPL-SCNC: 24 MMOL/L (ref 23–31)
CREAT SERPL-MCNC: 1.65 MG/DL (ref 0.55–1.02)
EOSINOPHIL # BLD AUTO: 0.17 X10(3)/MCL (ref 0–0.9)
EOSINOPHIL NFR BLD AUTO: 2.6 %
ERYTHROCYTE [DISTWIDTH] IN BLOOD BY AUTOMATED COUNT: 15 % (ref 11.5–17)
GFR SERPLBLD CREATININE-BSD FMLA CKD-EPI: 35 MLS/MIN/1.73/M2
GLOBULIN SER-MCNC: 3.6 GM/DL (ref 2.4–3.5)
GLUCOSE SERPL-MCNC: 101 MG/DL (ref 82–115)
HCT VFR BLD AUTO: 31.5 % (ref 37–47)
HGB BLD-MCNC: 10.3 G/DL (ref 12–16)
IMM GRANULOCYTES # BLD AUTO: 0.02 X10(3)/MCL (ref 0–0.04)
IMM GRANULOCYTES NFR BLD AUTO: 0.3 %
IRON SATN MFR SERPL: 20 % (ref 20–50)
IRON SERPL-MCNC: 58 UG/DL (ref 50–170)
LYMPHOCYTES # BLD AUTO: 1.63 X10(3)/MCL (ref 0.6–4.6)
LYMPHOCYTES NFR BLD AUTO: 25 %
MCH RBC QN AUTO: 28.4 PG (ref 27–31)
MCHC RBC AUTO-ENTMCNC: 32.7 G/DL (ref 33–36)
MCV RBC AUTO: 86.8 FL (ref 80–94)
MONOCYTES # BLD AUTO: 0.46 X10(3)/MCL (ref 0.1–1.3)
MONOCYTES NFR BLD AUTO: 7.1 %
NEUTROPHILS # BLD AUTO: 4.2 X10(3)/MCL (ref 2.1–9.2)
NEUTROPHILS NFR BLD AUTO: 64.5 %
NRBC BLD AUTO-RTO: 0 %
PLATELET # BLD AUTO: 300 X10(3)/MCL (ref 130–400)
PMV BLD AUTO: 10.6 FL (ref 7.4–10.4)
POTASSIUM SERPL-SCNC: 5.6 MMOL/L (ref 3.5–5.1)
PROT SERPL-MCNC: 7.3 GM/DL (ref 5.8–7.6)
RBC # BLD AUTO: 3.63 X10(6)/MCL (ref 4.2–5.4)
SODIUM SERPL-SCNC: 144 MMOL/L (ref 136–145)
TIBC SERPL-MCNC: 230 UG/DL (ref 70–310)
TIBC SERPL-MCNC: 288 UG/DL (ref 250–450)
TRANSFERRIN SERPL-MCNC: 263 MG/DL (ref 180–382)
WBC # SPEC AUTO: 6.5 X10(3)/MCL (ref 4.5–11.5)

## 2023-04-25 PROCEDURE — 85025 COMPLETE CBC W/AUTO DIFF WBC: CPT

## 2023-04-25 PROCEDURE — 83550 IRON BINDING TEST: CPT

## 2023-04-25 PROCEDURE — 80053 COMPREHEN METABOLIC PANEL: CPT

## 2023-04-25 PROCEDURE — 36415 COLL VENOUS BLD VENIPUNCTURE: CPT

## 2023-04-25 PROCEDURE — 82378 CARCINOEMBRYONIC ANTIGEN: CPT

## 2024-09-03 ENCOUNTER — HOSPITAL ENCOUNTER (EMERGENCY)
Facility: HOSPITAL | Age: 59
Discharge: HOME | End: 2024-09-03
Attending: EMERGENCY MEDICINE
Payer: COMMERCIAL

## 2024-09-03 VITALS
WEIGHT: 256.63 LBS | SYSTOLIC BLOOD PRESSURE: 145 MMHG | HEIGHT: 65 IN | RESPIRATION RATE: 18 BRPM | DIASTOLIC BLOOD PRESSURE: 73 MMHG | OXYGEN SATURATION: 100 % | BODY MASS INDEX: 42.76 KG/M2 | TEMPERATURE: 98 F | HEART RATE: 80 BPM

## 2024-09-03 DIAGNOSIS — K85.90 ACUTE PANCREATITIS WITHOUT INFECTION OR NECROSIS, UNSPECIFIED PANCREATITIS TYPE: Primary | ICD-10-CM

## 2024-09-03 DIAGNOSIS — R10.10 UPPER ABDOMINAL PAIN: ICD-10-CM

## 2024-09-03 LAB
ALBUMIN SERPL-MCNC: 3.4 G/DL (ref 3.4–4.8)
ALBUMIN/GLOB SERPL: 0.7 RATIO (ref 1.1–2)
ALP SERPL-CCNC: 117 UNIT/L (ref 40–150)
ALT SERPL-CCNC: 35 UNIT/L (ref 0–55)
ANION GAP SERPL CALC-SCNC: 8 MEQ/L
AST SERPL-CCNC: 27 UNIT/L (ref 5–34)
BACTERIA #/AREA URNS AUTO: ABNORMAL /HPF
BASOPHILS # BLD AUTO: 0.02 X10(3)/MCL
BASOPHILS NFR BLD AUTO: 0.2 %
BILIRUB SERPL-MCNC: 0.5 MG/DL
BILIRUB UR QL STRIP.AUTO: NEGATIVE
BUN SERPL-MCNC: 18.9 MG/DL (ref 9.8–20.1)
CALCIUM SERPL-MCNC: 9.7 MG/DL (ref 8.4–10.2)
CHLORIDE SERPL-SCNC: 108 MMOL/L (ref 98–107)
CLARITY UR: CLEAR
CO2 SERPL-SCNC: 25 MMOL/L (ref 23–31)
COLOR UR AUTO: COLORLESS
CREAT SERPL-MCNC: 1.55 MG/DL (ref 0.55–1.02)
CREAT/UREA NIT SERPL: 12
EOSINOPHIL # BLD AUTO: 0.04 X10(3)/MCL (ref 0–0.9)
EOSINOPHIL NFR BLD AUTO: 0.4 %
ERYTHROCYTE [DISTWIDTH] IN BLOOD BY AUTOMATED COUNT: 14.6 % (ref 11.5–17)
GFR SERPLBLD CREATININE-BSD FMLA CKD-EPI: 38 ML/MIN/1.73/M2
GLOBULIN SER-MCNC: 4.7 GM/DL (ref 2.4–3.5)
GLUCOSE SERPL-MCNC: 115 MG/DL (ref 82–115)
GLUCOSE UR QL STRIP: ABNORMAL
HCT VFR BLD AUTO: 33 % (ref 37–47)
HGB BLD-MCNC: 11 G/DL (ref 12–16)
HGB UR QL STRIP: NEGATIVE
IMM GRANULOCYTES # BLD AUTO: 0.03 X10(3)/MCL (ref 0–0.04)
IMM GRANULOCYTES NFR BLD AUTO: 0.3 %
KETONES UR QL STRIP: NEGATIVE
LEUKOCYTE ESTERASE UR QL STRIP: NEGATIVE
LIPASE SERPL-CCNC: 14 U/L
LYMPHOCYTES # BLD AUTO: 1.54 X10(3)/MCL (ref 0.6–4.6)
LYMPHOCYTES NFR BLD AUTO: 15.6 %
MCH RBC QN AUTO: 28.3 PG (ref 27–31)
MCHC RBC AUTO-ENTMCNC: 33.3 G/DL (ref 33–36)
MCV RBC AUTO: 84.8 FL (ref 80–94)
MONOCYTES # BLD AUTO: 0.8 X10(3)/MCL (ref 0.1–1.3)
MONOCYTES NFR BLD AUTO: 8.1 %
MUCOUS THREADS URNS QL MICRO: ABNORMAL /LPF
NEUTROPHILS # BLD AUTO: 7.42 X10(3)/MCL (ref 2.1–9.2)
NEUTROPHILS NFR BLD AUTO: 75.4 %
NITRITE UR QL STRIP: NEGATIVE
NRBC BLD AUTO-RTO: 0 %
OHS QRS DURATION: 130 MS
OHS QTC CALCULATION: 435 MS
PH UR STRIP: 6 [PH]
PLATELET # BLD AUTO: 259 X10(3)/MCL (ref 130–400)
PMV BLD AUTO: 10.6 FL (ref 7.4–10.4)
POTASSIUM SERPL-SCNC: 5.5 MMOL/L (ref 3.5–5.1)
PROT SERPL-MCNC: 8.1 GM/DL (ref 5.8–7.6)
PROT UR QL STRIP: ABNORMAL
RBC # BLD AUTO: 3.89 X10(6)/MCL (ref 4.2–5.4)
RBC #/AREA URNS AUTO: ABNORMAL /HPF
SODIUM SERPL-SCNC: 141 MMOL/L (ref 136–145)
SP GR UR STRIP.AUTO: 1.01 (ref 1–1.03)
SQUAMOUS #/AREA URNS LPF: ABNORMAL /HPF
TROPONIN I SERPL-MCNC: <0.01 NG/ML (ref 0–0.04)
UROBILINOGEN UR STRIP-ACNC: NORMAL
WBC # BLD AUTO: 9.85 X10(3)/MCL (ref 4.5–11.5)
WBC #/AREA URNS AUTO: ABNORMAL /HPF

## 2024-09-03 PROCEDURE — 96372 THER/PROPH/DIAG INJ SC/IM: CPT | Performed by: NURSE PRACTITIONER

## 2024-09-03 PROCEDURE — 93010 ELECTROCARDIOGRAM REPORT: CPT | Mod: ,,, | Performed by: INTERNAL MEDICINE

## 2024-09-03 PROCEDURE — 80053 COMPREHEN METABOLIC PANEL: CPT | Performed by: NURSE PRACTITIONER

## 2024-09-03 PROCEDURE — 81001 URINALYSIS AUTO W/SCOPE: CPT | Performed by: NURSE PRACTITIONER

## 2024-09-03 PROCEDURE — 83690 ASSAY OF LIPASE: CPT | Performed by: NURSE PRACTITIONER

## 2024-09-03 PROCEDURE — 25000003 PHARM REV CODE 250: Performed by: NURSE PRACTITIONER

## 2024-09-03 PROCEDURE — 96361 HYDRATE IV INFUSION ADD-ON: CPT

## 2024-09-03 PROCEDURE — 63600175 PHARM REV CODE 636 W HCPCS

## 2024-09-03 PROCEDURE — 93005 ELECTROCARDIOGRAM TRACING: CPT

## 2024-09-03 PROCEDURE — 85025 COMPLETE CBC W/AUTO DIFF WBC: CPT | Performed by: NURSE PRACTITIONER

## 2024-09-03 PROCEDURE — 96374 THER/PROPH/DIAG INJ IV PUSH: CPT

## 2024-09-03 PROCEDURE — 84484 ASSAY OF TROPONIN QUANT: CPT | Performed by: NURSE PRACTITIONER

## 2024-09-03 PROCEDURE — 63600175 PHARM REV CODE 636 W HCPCS: Performed by: NURSE PRACTITIONER

## 2024-09-03 PROCEDURE — 99285 EMERGENCY DEPT VISIT HI MDM: CPT | Mod: 25

## 2024-09-03 PROCEDURE — 96375 TX/PRO/DX INJ NEW DRUG ADDON: CPT

## 2024-09-03 RX ORDER — HYDRALAZINE HYDROCHLORIDE 20 MG/ML
10 INJECTION INTRAMUSCULAR; INTRAVENOUS
Status: COMPLETED | OUTPATIENT
Start: 2024-09-03 | End: 2024-09-03

## 2024-09-03 RX ORDER — DICYCLOMINE HYDROCHLORIDE 10 MG/ML
20 INJECTION INTRAMUSCULAR
Status: COMPLETED | OUTPATIENT
Start: 2024-09-03 | End: 2024-09-03

## 2024-09-03 RX ORDER — FAMOTIDINE 10 MG/ML
20 INJECTION INTRAVENOUS
Status: COMPLETED | OUTPATIENT
Start: 2024-09-03 | End: 2024-09-03

## 2024-09-03 RX ORDER — HYDROCODONE BITARTRATE AND ACETAMINOPHEN 7.5; 325 MG/1; MG/1
1 TABLET ORAL EVERY 6 HOURS PRN
Qty: 12 TABLET | Refills: 0 | Status: SHIPPED | OUTPATIENT
Start: 2024-09-03

## 2024-09-03 RX ORDER — MORPHINE SULFATE 4 MG/ML
4 INJECTION, SOLUTION INTRAMUSCULAR; INTRAVENOUS
Status: COMPLETED | OUTPATIENT
Start: 2024-09-03 | End: 2024-09-03

## 2024-09-03 RX ORDER — SODIUM CHLORIDE 9 MG/ML
1000 INJECTION, SOLUTION INTRAVENOUS
Status: COMPLETED | OUTPATIENT
Start: 2024-09-03 | End: 2024-09-03

## 2024-09-03 RX ORDER — ONDANSETRON 4 MG/1
4 TABLET, FILM COATED ORAL EVERY 6 HOURS PRN
Qty: 12 TABLET | Refills: 0 | Status: SHIPPED | OUTPATIENT
Start: 2024-09-03

## 2024-09-03 RX ORDER — ONDANSETRON HYDROCHLORIDE 2 MG/ML
4 INJECTION, SOLUTION INTRAVENOUS
Status: COMPLETED | OUTPATIENT
Start: 2024-09-03 | End: 2024-09-03

## 2024-09-03 RX ADMIN — SODIUM CHLORIDE 1000 ML: 9 INJECTION, SOLUTION INTRAVENOUS at 11:09

## 2024-09-03 RX ADMIN — FAMOTIDINE 20 MG: 10 INJECTION, SOLUTION INTRAVENOUS at 11:09

## 2024-09-03 RX ADMIN — ONDANSETRON 4 MG: 2 INJECTION INTRAMUSCULAR; INTRAVENOUS at 01:09

## 2024-09-03 RX ADMIN — DICYCLOMINE HYDROCHLORIDE 20 MG: 20 INJECTION INTRAMUSCULAR at 12:09

## 2024-09-03 RX ADMIN — HYDRALAZINE HYDROCHLORIDE 10 MG: 20 INJECTION INTRAMUSCULAR; INTRAVENOUS at 04:09

## 2024-09-03 RX ADMIN — MORPHINE SULFATE 4 MG: 4 INJECTION, SOLUTION INTRAMUSCULAR; INTRAVENOUS at 01:09

## 2024-09-03 NOTE — DISCHARGE INSTRUCTIONS
Thanks for letting us take care of you today!  It is our goal to give you courteous care and to keep you comfortable and informed, if you have any questions before you leave I will be happy to try and answer them.    Here is some advice after your visit:      Your visit in the emergency department is NOT definitive care - please follow-up with your primary care doctor and/or specialist within 1 week.  Please return if you have any worsening in your condition or if you have any other concerns.    If you had radiology exams like an XRAY or CT in the emergency Department the interpreation on them may be preliminary - there may be less time sensitive findings on the reports please obtain these reports within 24 hours from the hospital or by using your out on your mobile phone to access records.  Bring these to your primary care doctor and/or specialist for further review of incidental findings.    Please review any LAB WORK from your visit today with your primary care physician.    If you were prescribed OPIATE PAIN MEDICATION - please understand of these medications can be addictive, you may fill less of the prescription was written for, you do not have to take the full prescription.  You may discard what you do not use.  Please seek help if you feel you are having problems with addiction.  Do not drive or operate heavy machinery if you are taking sedating medications.  Do not mix these medications with alcohol.

## 2024-09-03 NOTE — ED PROVIDER NOTES
Encounter Date: 9/3/2024       History     Chief Complaint   Patient presents with    Abdominal Pain     Pt co abd pain with radiation bilat back. Pt n/v (bile)/ last normal BM 2 days ago.     Nausea    Vomiting     Denies fever/coughing     59 y.o.  female with a history of DM, hypertension, and colon cancer presents to Emergency Department with a chief complaint of abdominal pain. Symptoms began several months ago and have been recurrent since onset. Reports symptoms worsened on last night. Associated symptoms include nausea and vomiting. She reports her pain starts in the epigastric area and radiates into her upper back. Symptoms are aggravated with palpation and there are no alleviating factors. The patient denies CP, SOB, fever, chills, diarrhea or constipation. LBM: yesterday. No other reported symptoms at this time      The history is provided by the patient. No  was used.   Abdominal Pain  The current episode started several weeks ago. The onset of the illness was gradual. The problem has been gradually worsening. The abdominal pain is located in the epigastric region. The abdominal pain radiates to the back. The other symptoms of the illness include nausea and vomiting. The other symptoms of the illness do not include fever or shortness of breath.   Nausea began yesterday.   The vomiting began yesterday.   The patient states that she believes she is currently not pregnant. The patient has not had a change in bowel habit. Additional symptoms associated with the illness include back pain. Symptoms associated with the illness do not include chills or diaphoresis.   Nausea  Associated symptoms include abdominal pain. Pertinent negatives include no chest pain and no shortness of breath.   Emesis   Associated symptoms include abdominal pain. Pertinent negatives include no chills, no cough and no fever.     Review of patient's allergies indicates:  No Known Allergies  Past  Medical History:   Diagnosis Date    Anemia     Cancer of right colon 2017    Diabetes mellitus     Encounter for blood transfusion     Essential hypertension 2018    Heart murmur     Hypertension     Ovarian mass, right 2018    Type 2 diabetes mellitus without complication, without long-term current use of insulin 2018    Vaginal yeast infection 2/15/2018     Past Surgical History:   Procedure Laterality Date    abdominal ex-lap   1994    to remove POC after delivery      SECTION      x 1    COLON SURGERY      RHC Stage II    TONSILLECTOMY       Family History   Problem Relation Name Age of Onset    Lung disease Father          malignant neoplasm of lung     Prostate cancer Father          Malignant neoplasm of prostate     Ovarian cancer Neg Hx      Uterine cancer Neg Hx      Breast cancer Neg Hx      Colon cancer Neg Hx       Social History     Tobacco Use    Smoking status: Never    Smokeless tobacco: Never   Substance Use Topics    Alcohol use: No    Drug use: No     Review of Systems   Constitutional:  Negative for chills, diaphoresis and fever.   Eyes:  Negative for photophobia and visual disturbance.   Respiratory:  Negative for cough, shortness of breath and wheezing.    Cardiovascular:  Negative for chest pain, palpitations and leg swelling.   Gastrointestinal:  Positive for abdominal pain, nausea and vomiting.   Musculoskeletal:  Positive for back pain.   All other systems reviewed and are negative.      Physical Exam     Initial Vitals   BP Pulse Resp Temp SpO2   24 1057 24 1057 24 1055 24 1057 24 1057   (!) 195/84 76 20 98 °F (36.7 °C) 100 %      MAP       --                Physical Exam    Nursing note and vitals reviewed.  Constitutional: She appears well-developed and well-nourished. She is not diaphoretic. She is cooperative.  Non-toxic appearance. No distress.   No active vomiting.    HENT:   Head: Normocephalic and atraumatic.   Right  Ear: External ear normal.   Left Ear: External ear normal.   Nose: Nose normal.   Eyes: Conjunctivae and EOM are normal. Pupils are equal, round, and reactive to light.   Neck: Neck supple.   Normal range of motion.  Cardiovascular:  Normal rate, regular rhythm, S1 normal, S2 normal, normal heart sounds, intact distal pulses and normal pulses.           Pulmonary/Chest: Effort normal and breath sounds normal. No tachypnea and no bradypnea. No respiratory distress. She has no decreased breath sounds. She has no wheezes. She has no rhonchi. She has no rales.   Abdominal: Abdomen is soft. Bowel sounds are normal. She exhibits no distension. There is abdominal tenderness in the epigastric area.   Tenderness noted to epigastric region that radiates to her upper back. There is no rebound and no guarding.   Musculoskeletal:         General: Normal range of motion.      Cervical back: Normal range of motion and neck supple.        Back:       Comments: No spinous tenderness.      Neurological: She is alert and oriented to person, place, and time. She has normal strength. No sensory deficit. GCS score is 15. GCS eye subscore is 4. GCS verbal subscore is 5. GCS motor subscore is 6.   Skin: Skin is warm and dry. Capillary refill takes less than 2 seconds.   Psychiatric: She has a normal mood and affect. Thought content normal.         ED Course   Procedures  Labs Reviewed   CBC WITH DIFFERENTIAL - Abnormal       Result Value    WBC 9.85      RBC 3.89 (*)     Hgb 11.0 (*)     Hct 33.0 (*)     MCV 84.8      MCH 28.3      MCHC 33.3      RDW 14.6      Platelet 259      MPV 10.6 (*)     Neut % 75.4      Lymph % 15.6      Mono % 8.1      Eos % 0.4      Basophil % 0.2      Lymph # 1.54      Neut # 7.42      Mono # 0.80      Eos # 0.04      Baso # 0.02      IG# 0.03      IG% 0.3      NRBC% 0.0     COMPREHENSIVE METABOLIC PANEL - Abnormal    Sodium 141      Potassium 5.5 (*)     Chloride 108 (*)     CO2 25      Glucose 115      Blood  Urea Nitrogen 18.9      Creatinine 1.55 (*)     Calcium 9.7      Protein Total 8.1 (*)     Albumin 3.4      Globulin 4.7 (*)     Albumin/Globulin Ratio 0.7 (*)     Bilirubin Total 0.5            ALT 35      AST 27      eGFR 38      Anion Gap 8.0      BUN/Creatinine Ratio 12     URINALYSIS, REFLEX TO URINE CULTURE - Abnormal    Color, UA Colorless      Appearance, UA Clear      Specific Gravity, UA 1.009      pH, UA 6.0      Protein, UA Trace (*)     Glucose, UA 4+ (*)     Ketones, UA Negative      Blood, UA Negative      Bilirubin, UA Negative      Urobilinogen, UA Normal      Nitrites, UA Negative      Leukocyte Esterase, UA Negative      RBC, UA 0-5      WBC, UA 0-5      Bacteria, UA None Seen      Squamous Epithelial Cells, UA Occasional (*)     Mucous, UA Trace (*)    LIPASE - Normal    Lipase Level 14     TROPONIN I - Normal    Troponin-I <0.010       EKG Readings: (Independently Interpreted)   Initial Reading: No STEMI. Rhythm: Normal Sinus Rhythm. Heart Rate: 75. Conduction: RBBB.     ECG Results              EKG 12-lead (Final result)        Collection Time Result Time QRS Duration OHS QTC Calculation    09/03/24 10:48:50 09/03/24 11:28:25 130 435                     Final result by Interface, Lab In [facility] (09/03/24 11:28:34)                   Narrative:    Test Reason : R10.10,    Vent. Rate : 075 BPM     Atrial Rate : 075 BPM     P-R Int : 172 ms          QRS Dur : 130 ms      QT Int : 390 ms       P-R-T Axes : 021 -13 025 degrees     QTc Int : 435 ms    Normal sinus rhythm  Right bundle branch block  Minimal voltage criteria for LVH, may be normal variant ( R in aVL )  Abnormal ECG  When compared with ECG of 25-JAN-2018 11:36,  No significant change was found  Confirmed by Melvi Laura MD ([id number]) on 9/3/2024 11:28:24 AM    Referred By:             Confirmed By:Melvi Laura MD                                  Imaging Results              CT Abdomen Pelvis  Without Contrast (Final result)  Result  time 09/03/24 15:30:28      Final result by oRs Lindsay MD (09/03/24 15:30:28)                   Impression:      Inflammatory changes around the pancreatic head and uncinate process and extending to the mesenteric root.  Findings could represent acute pancreatitis with concomitant mesenteritis.  Correlation with clinical and laboratory data is recommended.      Electronically signed by: Ian Lindsay  Date:    09/03/2024  Time:    15:30               Narrative:    EXAMINATION:  CT ABDOMEN PELVIS WITHOUT CONTRAST    CLINICAL HISTORY:  Epigastric pain;Nausea/vomiting;    TECHNIQUE:  Low dose axial images, sagittal and coronal reformations were obtained from the lung bases to the pubic symphysis.  No contrast was administered.  Automatic exposure control is utilized to reduce patient radiation exposure.    COMPARISON:  None    FINDINGS:  The lung bases are clear.    The liver appears normal.  No obvious liver mass or lesion is seen.    Gallbladder appears normal.  No gallstones are seen.    There are inflammatory changes seen around the head and uncinate process of the pancreas as well as the mesenteric root.  Findings could represent acute pancreatitis.  Associated mesenteritis should also be excluded..    The spleen appears normal and adrenal glands appear normal.  No adrenal nodule is seen.    No nephrolithiasis is seen.  No hydronephrosis is seen.  No hydroureter is seen.  No ureteral stone is seen.    No colitis is seen.  No diverticulitis is seen.  No appendicitis is seen.    No free air seen.  No free fluid is seen.  The urinary bladder appears normal.    Bones show no acute abnormality.                                       US Abdomen Limited (Final result)  Result time 09/03/24 13:34:18      Final result by Andre Javed MD (09/03/24 13:34:18)                   Impression:      1. No gallstones or biliary ductal dilatation.  2. Mild hepatomegaly with possible steatosis      Electronically  signed by: Andre Javed  Date:    09/03/2024  Time:    13:34               Narrative:    EXAMINATION:  US ABDOMEN LIMITED    CLINICAL HISTORY:  upper abdominal pain;    COMPARISON:  CT 9 April 2019.    FINDINGS:  Grayscale, color and spectral doppler evaluation of the right upper quadrant.    No focal abnormality of limited visualized pancreas. Imaged portions of aorta and IVC normal in caliber.    The liver is mildly enlarged.  Heterogeneous hepatic parenchymal echogenicity.  Normal hepatopetal flow is noted in the portal vein.    No gallstones. No significant gallbladder wall thickening or pericholecystic fluid.  The common bile duct is normal in caliber  and measures 3 mm.    Right kidney measures 9 cm in length. No hydronephrosis.    No ascites.                                       Medications   famotidine (PF) injection 20 mg (20 mg Intravenous Given 9/3/24 1114)   0.9%  NaCl infusion (0 mLs Intravenous Stopped 9/3/24 1232)   dicyclomine injection 20 mg (20 mg Intramuscular Given 9/3/24 1257)   morphine injection 4 mg (4 mg Intravenous Given 9/3/24 1352)   ondansetron injection 4 mg (4 mg Intravenous Given 9/3/24 1351)   hydrALAZINE injection 10 mg (10 mg Intravenous Given 9/3/24 1643)     Medical Decision Making  Patient awake, alert, has non-labored breathing, and follows commands appropriately. Arrived to ED due to abdominal pain with radiation to her upper back and n/v. She reports her abdominal pain started several months ago but symptoms worsened last night. Afebrile. NAD Noted.     Judging by the patient's chief complaint and pertinent history, the patient has the following possible differential diagnoses, including but not limited to the following: Gallstones, Cholecystitis, Abdominal Pain, Viral Gastroenteritis, GERD    Some of these are deemed to be lower likelihood and some more likely based on my physical exam and history combined with possible lab work and/or imaging studies. Please see the  pertinent studies, and refer to the HPI. Some of these diagnoses will take further evaluation to fully rule out, perhaps as an outpatient and the patient was encouraged to follow up when discharged for more comprehensive evaluation.       Amount and/or Complexity of Data Reviewed  Labs: ordered. Decision-making details documented in ED Course.     Details: No leukocytosis noted. K mildly elevated but trending lower than previous labs. Cr elevated but trending lower than previous labs. Hx of CKD. Lipase and LFTs WNL. Informed patient of results   Radiology: ordered. Decision-making details documented in ED Course.     Details: Informed patient of results.   Discussion of management or test interpretation with external provider(s): Patient's PO challenge successful. Patient's pain is not intractable, she is able to tolerate PO successfully, and has no additional complaints. Patient to f/u with PCP on tomorrow for outpatient evaluation. Will prescribe pain medication and anti-emetics. Patient stable for discharge. Patient comfortable with plan of care and being discharge home. Discussed plan of care and interventions with patient. Agreed to and aware of plan of care. Comfortable being discharged home. Patient discharged home. Patient denies new or additional complaints; no further tests indicated at this time. Verbalized understanding of instructions. No emergent or apparent distress noted prior to discharge. To follow up with PCP in 1 day as needed. Strict ER return precautions given.       Risk  OTC drugs.  Prescription drug management.               ED Course as of 09/03/24 1711   Tue Sep 03, 2024   1301 Creatinine(!): 1.55  Trending lower than previous labs.  [JA]   1301 Potassium(!): 5.5  Trending near previous labs.  [JA]   1337 US Abdomen Limited  1. No gallstones or biliary ductal dilatation.  2. Mild hepatomegaly with possible steatosis   [JA]   1536 CT Abdomen Pelvis  Without Contrast  Inflammatory changes  around the pancreatic head and uncinate process and extending to the mesenteric root.  Findings could represent acute pancreatitis with concomitant mesenteritis.  Correlation with clinical and laboratory data is recommended. [JA]   1545 Discussed patient with Dr. Steel. Patient's pain controlled after med admin and had no active vomiting in ER. Will discharge home with pain medication, anti-emetics, and clear liquid diet. Patient to f/u with PCP on tomorrow, will possibly need outpatient MRI of abdomen.  [JA]   1550 PO challenge initiated.  [JA]   1642 Patient's BP elevated. Did not take home medication on today. Will give dose of Hydralazine prior to dispo home. Denies CP, SOB, dizziness, weakness, or headache.  [JA]   1709 Patient's BP improved after med admin.  [JA]      ED Course User Index  [JA] Kely Hampton NP                           Clinical Impression:  Final diagnoses:  [R10.10] Upper abdominal pain  [K85.90] Acute pancreatitis without infection or necrosis, unspecified pancreatitis type (Primary)          ED Disposition Condition    Discharge Stable          ED Prescriptions       Medication Sig Dispense Start Date End Date Auth. Provider    HYDROcodone-acetaminophen (NORCO) 7.5-325 mg per tablet Take 1 tablet by mouth every 6 (six) hours as needed for Pain. 12 tablet 9/3/2024 -- Kely Hampton NP    ondansetron (ZOFRAN) 4 MG tablet Take 1 tablet (4 mg total) by mouth every 6 (six) hours as needed for Nausea. 12 tablet 9/3/2024 -- Kely Hampton NP          Follow-up Information       Follow up With Specialties Details Why Contact Info    John Watson MD Family Medicine Call in 1 day If symptoms worsen, As needed [address] [place] [address]  [phone]      [facility] - Emergency Dept Emergency Medicine Go to  If symptoms worsen, As needed [address] [facility] [address] [phone]    Bill Diaz MD Gastroenterology Call in 1 day As  needed, If symptoms worsen [address] [facility]  [address]  [facility] [address]  [phone]               Kely Hampton, NP  09/03/24 [id number]

## 2024-09-03 NOTE — ED NOTES
Pt provided faviola crackers and water to PO challenge per MARINO Edge.   REFILL REQUEST APPROVED.  Requested Prescriptions   Pending Prescriptions Disp Refills    blood-glucose meter kit 1 each 0     Sig: Use as instructed    lancets Misc 100 each 11     Sig: Check blood glucose 1-2 times daily as directed and as needed for symptoms of low or high blood sugar    blood sugar diagnostic (BLOOD GLUCOSE TEST) Strp 100 strip 11     Sig: Check blood glucose 1-2 times daily as directed and as needed for symptoms of low or high blood sugar

## 2024-09-03 NOTE — FIRST PROVIDER EVALUATION
"Medical screening examination initiated.  I have conducted a focused provider triage encounter, findings are as follows:  Chief Complaint   Patient presents with    Abdominal Pain     Pt co abd pain with radiation bilat back. Pt n/v (bile)/ last normal BM 2 days ago.     Nausea    Vomiting     Denies fever/coughing       Brief history of present illness:  60 y/o female who presents with c/o upper abdominal pain which radiates to back yesterday. +n/v. Took laxative to help because thought she was constipated.     Vitals:    09/03/24 1055   Resp: 20   TempSrc: Oral   Height: 5' 4.5" (1.638 m)       Pertinent physical exam:  alert, nonlabored, obese, ambulatory     Brief workup plan:  EKG, labs, urine    Preliminary workup initiated; this workup will be continued and followed by the physician or advanced practice provider that is assigned to the patient when roomed.  "

## 2024-09-03 NOTE — Clinical Note
"Raegan"Erick Trinidad was seen and treated in our emergency department on 9/3/2024.  She may return to work on 09/06/2024.       If you have any questions or concerns, please don't hesitate to call.      Kely Hampton, NP"

## 2024-09-05 DIAGNOSIS — R10.10 UPPER ABDOMINAL PAIN: Primary | ICD-10-CM

## 2024-09-20 ENCOUNTER — HOSPITAL ENCOUNTER (OUTPATIENT)
Dept: RADIOLOGY | Facility: HOSPITAL | Age: 59
Discharge: HOME | End: 2024-09-20
Attending: FAMILY MEDICINE
Payer: COMMERCIAL

## 2024-09-20 DIAGNOSIS — R10.10 UPPER ABDOMINAL PAIN: ICD-10-CM

## 2024-09-20 PROCEDURE — 74181 MRI ABDOMEN W/O CONTRAST: CPT | Mod: TC

## 2025-04-07 ENCOUNTER — HOSPITAL ENCOUNTER (OUTPATIENT)
Dept: RADIOLOGY | Facility: HOSPITAL | Age: 60
Discharge: HOME | End: 2025-04-07
Attending: FAMILY MEDICINE
Payer: COMMERCIAL

## 2025-04-07 DIAGNOSIS — Z12.31 ENCOUNTER FOR SCREENING MAMMOGRAM FOR BREAST CANCER: ICD-10-CM

## 2025-04-07 PROCEDURE — 77063 BREAST TOMOSYNTHESIS BI: CPT | Mod: 26,,, | Performed by: STUDENT IN AN ORGANIZED HEALTH CARE EDUCATION/TRAINING PROGRAM

## 2025-04-07 PROCEDURE — 77063 BREAST TOMOSYNTHESIS BI: CPT | Mod: TC

## 2025-04-07 PROCEDURE — 77067 SCR MAMMO BI INCL CAD: CPT | Mod: 26,,, | Performed by: STUDENT IN AN ORGANIZED HEALTH CARE EDUCATION/TRAINING PROGRAM

## (undated) DEVICE — SUT CTD VICRYL VIL BR SH 27

## (undated) DEVICE — SYR 30CC LUER LOCK

## (undated) DEVICE — DRESSING ADH ISLAND 3.6 X 14

## (undated) DEVICE — CUTTER PROXIMATE BLUE 75MM

## (undated) DEVICE — SUT MCRYL PLUS 4-0 PS2 27IN

## (undated) DEVICE — SEE MEDLINE ITEM 146417

## (undated) DEVICE — CLIPPER BLADE MOD 4406 (CAREF)

## (undated) DEVICE — RELOAD PROXIMATE CUT BLUE 75MM

## (undated) DEVICE — SEE MEDLINE ITEM 152622

## (undated) DEVICE — NDL 20GX1-1/2IN IB

## (undated) DEVICE — GOWN SURGICAL X-LARGE

## (undated) DEVICE — DRAPE STERI INSTRUMENT 1018

## (undated) DEVICE — COVER LIGHT HANDLE 80/CA

## (undated) DEVICE — NDL BOX COUNTER

## (undated) DEVICE — SEE MEDLINE ITEM 146347

## (undated) DEVICE — SEE MEDLINE ITEM 156902

## (undated) DEVICE — SYR 10CC LUER LOCK

## (undated) DEVICE — SEE MEDLINE ITEM 154981

## (undated) DEVICE — APPLICATOR CHLORAPREP ORN 26ML

## (undated) DEVICE — LOOP VESSEL YELLOW MAXI

## (undated) DEVICE — ELECTRODE REM PLYHSV RETURN 9

## (undated) DEVICE — SUT CTD VICRYL VIL BR CR/SH

## (undated) DEVICE — DRAPE ABDOMINAL TIBURON 14X11

## (undated) DEVICE — SPONGE LAP 18X18 PREWASHED

## (undated) DEVICE — ELECTRODE EXTENDED BLADE

## (undated) DEVICE — SUT 0 54IN COATED VICRYL U

## (undated) DEVICE — SUT 1 48IN PDS II VIO MONO

## (undated) DEVICE — SEE MEDLINE ITEM 157148

## (undated) DEVICE — SET DECANTER MEDICHOICE

## (undated) DEVICE — SUT CTD VICRYL 0 VIL BR/CT

## (undated) DEVICE — SYR ONLY LUER LOCK 20CC

## (undated) DEVICE — TRAY FOLEY 16FR INFECTION CONT

## (undated) DEVICE — NDL 22GA X1 1/2 REG BEVEL

## (undated) DEVICE — LUBRICANT SURGILUBE 2 OZ

## (undated) DEVICE — SEE MEDLINE ITEM 152487

## (undated) DEVICE — Device

## (undated) DEVICE — ADHESIVE DERMABOND ADVANCED

## (undated) DEVICE — SUT 2/0 54IN COATED VICRYL

## (undated) DEVICE — SEE MEDLINE ITEM 157144

## (undated) DEVICE — DRESSING ABSRBNT ISLAND 3.6X8